# Patient Record
Sex: FEMALE | Race: WHITE | NOT HISPANIC OR LATINO | URBAN - METROPOLITAN AREA
[De-identification: names, ages, dates, MRNs, and addresses within clinical notes are randomized per-mention and may not be internally consistent; named-entity substitution may affect disease eponyms.]

---

## 2017-06-15 ENCOUNTER — OFFICE (AMBULATORY)
Dept: URBAN - METROPOLITAN AREA CLINIC 75 | Facility: CLINIC | Age: 67
End: 2017-06-15
Payer: COMMERCIAL

## 2017-06-15 VITALS
HEART RATE: 66 BPM | SYSTOLIC BLOOD PRESSURE: 116 MMHG | HEIGHT: 68 IN | WEIGHT: 146 LBS | DIASTOLIC BLOOD PRESSURE: 72 MMHG

## 2017-06-15 DIAGNOSIS — K21.9 GASTRO-ESOPHAGEAL REFLUX DISEASE WITHOUT ESOPHAGITIS: ICD-10-CM

## 2017-06-15 DIAGNOSIS — F45.8 OTHER SOMATOFORM DISORDERS: ICD-10-CM

## 2017-06-15 DIAGNOSIS — R11.0 NAUSEA: ICD-10-CM

## 2017-06-15 DIAGNOSIS — K63.89 OTHER SPECIFIED DISEASES OF INTESTINE: ICD-10-CM

## 2017-06-15 DIAGNOSIS — K64.4 RESIDUAL HEMORRHOIDAL SKIN TAGS: ICD-10-CM

## 2017-06-15 DIAGNOSIS — K29.50 UNSPECIFIED CHRONIC GASTRITIS WITHOUT BLEEDING: ICD-10-CM

## 2017-06-15 DIAGNOSIS — Z86.010 PERSONAL HISTORY OF COLONIC POLYPS: ICD-10-CM

## 2017-06-15 DIAGNOSIS — K58.9 IRRITABLE BOWEL SYNDROME WITHOUT DIARRHEA: ICD-10-CM

## 2017-06-15 DIAGNOSIS — R10.13 EPIGASTRIC PAIN: ICD-10-CM

## 2017-06-15 DIAGNOSIS — R10.10 UPPER ABDOMINAL PAIN, UNSPECIFIED: ICD-10-CM

## 2017-06-15 PROCEDURE — 99213 OFFICE O/P EST LOW 20 MIN: CPT | Performed by: INTERNAL MEDICINE

## 2017-06-15 RX ORDER — LACTULOSE 10 G/15ML
SOLUTION ORAL
Qty: 2000 | Refills: 4 | Status: ACTIVE

## 2018-07-26 ENCOUNTER — TRANSCRIBE ORDERS (OUTPATIENT)
Dept: ADMINISTRATIVE | Facility: HOSPITAL | Age: 68
End: 2018-07-26

## 2018-07-26 DIAGNOSIS — M81.0 OSTEOPOROSIS, UNSPECIFIED OSTEOPOROSIS TYPE, UNSPECIFIED PATHOLOGICAL FRACTURE PRESENCE: Primary | ICD-10-CM

## 2018-10-22 ENCOUNTER — HOSPITAL ENCOUNTER (OUTPATIENT)
Dept: BONE DENSITY | Facility: HOSPITAL | Age: 68
Discharge: HOME | End: 2018-10-22
Admitting: FAMILY MEDICINE

## 2018-10-22 DIAGNOSIS — M81.0 OSTEOPOROSIS, UNSPECIFIED OSTEOPOROSIS TYPE, UNSPECIFIED PATHOLOGICAL FRACTURE PRESENCE: ICD-10-CM

## 2018-10-22 PROCEDURE — 77080 DXA BONE DENSITY AXIAL: CPT

## 2019-07-10 ENCOUNTER — OFFICE (AMBULATORY)
Dept: URBAN - METROPOLITAN AREA CLINIC 75 | Facility: CLINIC | Age: 69
End: 2019-07-10

## 2019-07-10 VITALS
SYSTOLIC BLOOD PRESSURE: 108 MMHG | WEIGHT: 154 LBS | HEIGHT: 68 IN | HEART RATE: 61 BPM | DIASTOLIC BLOOD PRESSURE: 70 MMHG

## 2019-07-10 DIAGNOSIS — F45.8 OTHER SOMATOFORM DISORDERS: ICD-10-CM

## 2019-07-10 DIAGNOSIS — R10.13 EPIGASTRIC PAIN: ICD-10-CM

## 2019-07-10 DIAGNOSIS — K63.89 OTHER SPECIFIED DISEASES OF INTESTINE: ICD-10-CM

## 2019-07-10 DIAGNOSIS — K62.5 HEMORRHAGE OF ANUS AND RECTUM: ICD-10-CM

## 2019-07-10 DIAGNOSIS — K21.9 GASTRO-ESOPHAGEAL REFLUX DISEASE WITHOUT ESOPHAGITIS: ICD-10-CM

## 2019-07-10 DIAGNOSIS — K58.9 IRRITABLE BOWEL SYNDROME WITHOUT DIARRHEA: ICD-10-CM

## 2019-07-10 DIAGNOSIS — K64.4 RESIDUAL HEMORRHOIDAL SKIN TAGS: ICD-10-CM

## 2019-07-10 DIAGNOSIS — R11.0 NAUSEA: ICD-10-CM

## 2019-07-10 DIAGNOSIS — R10.10 UPPER ABDOMINAL PAIN, UNSPECIFIED: ICD-10-CM

## 2019-07-10 DIAGNOSIS — Z86.010 PERSONAL HISTORY OF COLONIC POLYPS: ICD-10-CM

## 2019-07-10 DIAGNOSIS — K29.50 UNSPECIFIED CHRONIC GASTRITIS WITHOUT BLEEDING: ICD-10-CM

## 2019-07-10 PROCEDURE — 99214 OFFICE O/P EST MOD 30 MIN: CPT | Performed by: NURSE PRACTITIONER

## 2019-07-10 RX ORDER — HYDROCORTISONE 25 MG/G
5 CREAM TOPICAL
Qty: 1 | Refills: 1 | Status: ACTIVE
Start: 2019-07-10

## 2019-07-22 VITALS
SYSTOLIC BLOOD PRESSURE: 104 MMHG | DIASTOLIC BLOOD PRESSURE: 71 MMHG | OXYGEN SATURATION: 100 % | RESPIRATION RATE: 23 BRPM | HEART RATE: 59 BPM | DIASTOLIC BLOOD PRESSURE: 52 MMHG | HEIGHT: 68 IN | SYSTOLIC BLOOD PRESSURE: 97 MMHG | HEART RATE: 56 BPM | DIASTOLIC BLOOD PRESSURE: 74 MMHG | SYSTOLIC BLOOD PRESSURE: 113 MMHG | SYSTOLIC BLOOD PRESSURE: 90 MMHG | SYSTOLIC BLOOD PRESSURE: 91 MMHG | RESPIRATION RATE: 16 BRPM | RESPIRATION RATE: 21 BRPM | WEIGHT: 150 LBS | RESPIRATION RATE: 10 BRPM | SYSTOLIC BLOOD PRESSURE: 133 MMHG | SYSTOLIC BLOOD PRESSURE: 153 MMHG | HEART RATE: 57 BPM | OXYGEN SATURATION: 99 % | SYSTOLIC BLOOD PRESSURE: 128 MMHG | HEART RATE: 51 BPM | DIASTOLIC BLOOD PRESSURE: 47 MMHG | HEART RATE: 52 BPM | SYSTOLIC BLOOD PRESSURE: 130 MMHG | DIASTOLIC BLOOD PRESSURE: 89 MMHG | TEMPERATURE: 96.8 F | HEART RATE: 49 BPM | RESPIRATION RATE: 20 BRPM | OXYGEN SATURATION: 96 % | HEART RATE: 60 BPM | DIASTOLIC BLOOD PRESSURE: 56 MMHG | TEMPERATURE: 96.9 F | RESPIRATION RATE: 18 BRPM | RESPIRATION RATE: 17 BRPM | DIASTOLIC BLOOD PRESSURE: 54 MMHG | DIASTOLIC BLOOD PRESSURE: 73 MMHG | HEART RATE: 58 BPM | SYSTOLIC BLOOD PRESSURE: 103 MMHG | SYSTOLIC BLOOD PRESSURE: 124 MMHG | HEART RATE: 50 BPM | SYSTOLIC BLOOD PRESSURE: 99 MMHG | DIASTOLIC BLOOD PRESSURE: 68 MMHG | RESPIRATION RATE: 12 BRPM | SYSTOLIC BLOOD PRESSURE: 126 MMHG | RESPIRATION RATE: 13 BRPM | HEART RATE: 62 BPM | RESPIRATION RATE: 15 BRPM | DIASTOLIC BLOOD PRESSURE: 65 MMHG | HEART RATE: 54 BPM

## 2019-07-23 ENCOUNTER — AMBULATORY SURGICAL CENTER (AMBULATORY)
Dept: URBAN - METROPOLITAN AREA SURGERY 17 | Facility: SURGERY | Age: 69
End: 2019-07-23
Payer: COMMERCIAL

## 2019-07-23 ENCOUNTER — OFFICE (AMBULATORY)
Dept: URBAN - METROPOLITAN AREA PATHOLOGY 4 | Facility: PATHOLOGY | Age: 69
End: 2019-07-23

## 2019-07-23 DIAGNOSIS — K62.5 HEMORRHAGE OF ANUS AND RECTUM: ICD-10-CM

## 2019-07-23 DIAGNOSIS — D12.3 BENIGN NEOPLASM OF TRANSVERSE COLON: ICD-10-CM

## 2019-07-23 DIAGNOSIS — K62.1 RECTAL POLYP: ICD-10-CM

## 2019-07-23 DIAGNOSIS — Z86.010 PERSONAL HISTORY OF COLONIC POLYPS: ICD-10-CM

## 2019-07-23 DIAGNOSIS — D12.8 BENIGN NEOPLASM OF RECTUM: ICD-10-CM

## 2019-07-23 DIAGNOSIS — D12.2 BENIGN NEOPLASM OF ASCENDING COLON: ICD-10-CM

## 2019-07-23 DIAGNOSIS — K63.5 POLYP OF COLON: ICD-10-CM

## 2019-07-23 LAB
GI HISTOLOGY: A. UNSPECIFIED: (no result)
GI HISTOLOGY: B. UNSPECIFIED: (no result)
GI HISTOLOGY: C. UNSPECIFIED: (no result)
GI HISTOLOGY: PDF REPORT: (no result)

## 2019-07-23 PROCEDURE — 88305 TISSUE EXAM BY PATHOLOGIST: CPT | Performed by: INTERNAL MEDICINE

## 2019-07-23 PROCEDURE — 45385 COLONOSCOPY W/LESION REMOVAL: CPT | Performed by: INTERNAL MEDICINE

## 2019-07-23 PROCEDURE — 45380 COLONOSCOPY AND BIOPSY: CPT | Mod: 59 | Performed by: INTERNAL MEDICINE

## 2019-07-23 NOTE — SERVICEHPINOTES
I had the pleasure of seeing Ms. Mena in our gastroenterology office for follow-up visit. As you know, she is a very pleasant 68-year-old  female patient of Dr. Thayer with a history of GERD with esophagitis, IBS-C, and colon polyps. She was last seen in our office 6/15/2017 and was doing well from an upper and lower GI standpoint at that time. She presents today for evaluation of rectal bleeding.Patient has chronic constipation and takes lactulose 25 mL's 2 times per day w/ 1-2 formed BMs per day that are easy to pass. She was treated for a sinus infection in 2/2019 and since taking antibiotics she now sees blood in her stool and tooilet paper which she attributes to her hemorrhoids. She will a see BRBPR with every BM. She also reports pain and uses A&ampD ointment but has not tried preparation H or prescription medication for treatment of hemorrhoids. Underwent colonoscopy 3/3/2015 which revealed a 5 mm polyp in the cecum (do not have path available for review), internal/external hemorrhoids. Appetite and weight have been stable. She denies melena, unexplained weight loss, diarrhea, fecal leaking or incontinence.She is having more reflux now than at her last OV. She has not been on medication for several years. Her symptoms are worse in the evening but is sleeping on her left side with some improvement. She denies dysphagia, odynophagia, nausea, vomiting, abdominal pain, bloating or distention.DATA REVIEWED:BREGD 2/13/2013 nl esophagus and duodenum gastritis. Pathology revealed chronic gastritis, negative for H. pyloriBRCN 3/3/2015 with 5 mm polyp in the cecum and external hemorrhoids (no pathology available for review)

## 2020-02-21 ENCOUNTER — OFFICE VISIT (OUTPATIENT)
Dept: ORTHOPEDIC SURGERY | Facility: CLINIC | Age: 70
End: 2020-02-21

## 2020-02-21 VITALS — HEIGHT: 69 IN | TEMPERATURE: 97.5 F | WEIGHT: 151 LBS | BODY MASS INDEX: 22.36 KG/M2

## 2020-02-21 DIAGNOSIS — M25.562 CHRONIC PAIN OF LEFT KNEE: Primary | ICD-10-CM

## 2020-02-21 DIAGNOSIS — M17.12 ARTHRITIS OF LEFT KNEE: ICD-10-CM

## 2020-02-21 DIAGNOSIS — G89.29 CHRONIC PAIN OF LEFT KNEE: Primary | ICD-10-CM

## 2020-02-21 PROCEDURE — 99203 OFFICE O/P NEW LOW 30 MIN: CPT | Performed by: ORTHOPAEDIC SURGERY

## 2020-02-21 PROCEDURE — 20610 DRAIN/INJ JOINT/BURSA W/O US: CPT | Performed by: ORTHOPAEDIC SURGERY

## 2020-02-21 PROCEDURE — 73562 X-RAY EXAM OF KNEE 3: CPT | Performed by: ORTHOPAEDIC SURGERY

## 2020-02-21 RX ORDER — ISOPROPYL ALCOHOL 0.7 ML/ML
SWAB TOPICAL 2 TIMES DAILY
COMMUNITY
Start: 2020-02-05

## 2020-02-21 RX ORDER — ALENDRONATE SODIUM 70 MG/1
TABLET ORAL
COMMUNITY
Start: 2020-02-05

## 2020-02-21 RX ORDER — FLUTICASONE PROPIONATE 50 MCG
SPRAY, SUSPENSION (ML) NASAL
COMMUNITY
Start: 2020-01-08

## 2020-02-21 RX ORDER — GUAIFENESIN 200 MG/1
400 TABLET ORAL
COMMUNITY

## 2020-02-21 RX ORDER — PRAVASTATIN SODIUM 10 MG
TABLET ORAL
COMMUNITY
Start: 2020-02-11

## 2020-02-21 RX ORDER — METHYLPREDNISOLONE ACETATE 80 MG/ML
80 INJECTION, SUSPENSION INTRA-ARTICULAR; INTRALESIONAL; INTRAMUSCULAR; SOFT TISSUE
Status: COMPLETED | OUTPATIENT
Start: 2020-02-21 | End: 2020-02-21

## 2020-02-21 RX ORDER — CITALOPRAM 10 MG/1
TABLET ORAL
COMMUNITY
Start: 2020-02-10

## 2020-02-21 RX ORDER — TIZANIDINE 4 MG/1
TABLET ORAL
COMMUNITY
Start: 2019-12-26

## 2020-02-21 RX ORDER — KETOTIFEN FUMARATE 0.35 MG/ML
SOLUTION/ DROPS OPHTHALMIC
COMMUNITY

## 2020-02-21 RX ORDER — LINAGLIPTIN 5 MG/1
5 TABLET, FILM COATED ORAL DAILY
COMMUNITY
Start: 2020-01-27

## 2020-02-21 RX ORDER — LACTULOSE 10 G/15ML
SOLUTION ORAL; RECTAL
COMMUNITY
Start: 2020-02-19

## 2020-02-21 RX ORDER — NEOMYCIN/POLYMYXIN B/PRAMOXINE 3.5-10K-1
CREAM (GRAM) TOPICAL
COMMUNITY

## 2020-02-21 RX ORDER — FEXOFENADINE HCL 180 MG/1
TABLET ORAL
COMMUNITY

## 2020-02-21 RX ORDER — LACTULOSE 10 G/15ML
SOLUTION ORAL
COMMUNITY

## 2020-02-21 RX ORDER — LANCING DEVICE
EACH MISCELLANEOUS 2 TIMES DAILY
COMMUNITY
Start: 2020-02-05

## 2020-02-21 RX ORDER — MAGNESIUM CHLORIDE 64 MG
TABLET, DELAYED RELEASE (ENTERIC COATED) ORAL
COMMUNITY

## 2020-02-21 RX ORDER — OLOPATADINE HYDROCHLORIDE 2 MG/ML
1 SOLUTION/ DROPS OPHTHALMIC
COMMUNITY

## 2020-02-21 RX ORDER — OMEPRAZOLE 20 MG/1
CAPSULE, DELAYED RELEASE ORAL
COMMUNITY
Start: 2020-01-27

## 2020-02-21 RX ORDER — LEVOTHYROXINE SODIUM 88 UG/1
TABLET ORAL
COMMUNITY
Start: 2020-02-19

## 2020-02-21 RX ORDER — BLOOD-GLUCOSE METER
KIT MISCELLANEOUS 2 TIMES DAILY
COMMUNITY
Start: 2020-02-05

## 2020-02-21 RX ORDER — CALCIUM CARBONATE 500(1250)
600 TABLET ORAL 4 TIMES DAILY
COMMUNITY

## 2020-02-21 RX ORDER — ASPIRIN 325 MG
325 TABLET ORAL DAILY
COMMUNITY

## 2020-02-21 RX ADMIN — METHYLPREDNISOLONE ACETATE 80 MG: 80 INJECTION, SUSPENSION INTRA-ARTICULAR; INTRALESIONAL; INTRAMUSCULAR; SOFT TISSUE at 08:30

## 2020-03-02 ENCOUNTER — OFFICE (AMBULATORY)
Dept: URBAN - METROPOLITAN AREA CLINIC 75 | Facility: CLINIC | Age: 70
End: 2020-03-02
Payer: COMMERCIAL

## 2020-03-02 VITALS
WEIGHT: 152 LBS | SYSTOLIC BLOOD PRESSURE: 118 MMHG | DIASTOLIC BLOOD PRESSURE: 70 MMHG | HEART RATE: 62 BPM | HEIGHT: 68 IN

## 2020-03-02 DIAGNOSIS — K64.8 OTHER HEMORRHOIDS: ICD-10-CM

## 2020-03-02 DIAGNOSIS — K58.9 IRRITABLE BOWEL SYNDROME WITHOUT DIARRHEA: ICD-10-CM

## 2020-03-02 DIAGNOSIS — K29.50 UNSPECIFIED CHRONIC GASTRITIS WITHOUT BLEEDING: ICD-10-CM

## 2020-03-02 DIAGNOSIS — K62.5 HEMORRHAGE OF ANUS AND RECTUM: ICD-10-CM

## 2020-03-02 DIAGNOSIS — K64.4 RESIDUAL HEMORRHOIDAL SKIN TAGS: ICD-10-CM

## 2020-03-02 DIAGNOSIS — K21.9 GASTRO-ESOPHAGEAL REFLUX DISEASE WITHOUT ESOPHAGITIS: ICD-10-CM

## 2020-03-02 PROCEDURE — 99213 OFFICE O/P EST LOW 20 MIN: CPT | Performed by: NURSE PRACTITIONER

## 2020-03-02 RX ORDER — FAMOTIDINE 20 MG/1
40 TABLET, FILM COATED ORAL
Qty: 180 | Refills: 3 | Status: COMPLETED
Start: 2020-03-02 | End: 2020-10-15

## 2020-07-24 ENCOUNTER — OFFICE VISIT (OUTPATIENT)
Dept: ORTHOPEDIC SURGERY | Facility: CLINIC | Age: 70
End: 2020-07-24

## 2020-07-24 VITALS — HEIGHT: 68 IN | WEIGHT: 150 LBS | TEMPERATURE: 98 F | BODY MASS INDEX: 22.73 KG/M2

## 2020-07-24 DIAGNOSIS — M17.12 ARTHRITIS OF LEFT KNEE: Primary | ICD-10-CM

## 2020-07-24 PROCEDURE — 20610 DRAIN/INJ JOINT/BURSA W/O US: CPT | Performed by: ORTHOPAEDIC SURGERY

## 2020-07-24 PROCEDURE — 99213 OFFICE O/P EST LOW 20 MIN: CPT | Performed by: ORTHOPAEDIC SURGERY

## 2020-07-24 RX ORDER — METHYLPREDNISOLONE ACETATE 80 MG/ML
80 INJECTION, SUSPENSION INTRA-ARTICULAR; INTRALESIONAL; INTRAMUSCULAR; SOFT TISSUE
Status: COMPLETED | OUTPATIENT
Start: 2020-07-24 | End: 2020-07-24

## 2020-07-24 RX ADMIN — METHYLPREDNISOLONE ACETATE 80 MG: 80 INJECTION, SUSPENSION INTRA-ARTICULAR; INTRALESIONAL; INTRAMUSCULAR; SOFT TISSUE at 09:46

## 2020-07-24 NOTE — PROGRESS NOTES
Patient Name: Brenda Brown   YOB: 1950  Referring Primary Care Physician: Lisa Camargo MD  BMI: Body mass index is 22.81 kg/m².    Chief Complaint:    Chief Complaint   Patient presents with   • Left Knee - Follow-up   • Right Knee - Follow-up        HPI:     Brenda Brown is a 69 y.o. female who presents today for evaluation of   Chief Complaint   Patient presents with   • Left Knee - Follow-up   • Right Knee - Follow-up   .  Right knee was doing better but the left knee is bothering her at this time she follows up for it.  Discussed treatment options with her she thinks injections helped as much is anything in the past    This problem is not new to this examiner.     Subjective   Medications:   Home Medications:  Current Outpatient Medications on File Prior to Visit   Medication Sig   • Alcohol Swabs (PHARMACIST CHOICE ALCOHOL) pads 2 (Two) Times a Day. as directed   • alendronate (FOSAMAX) 70 MG tablet TAKE 1 (ONE) TABLET WEEKLY BEFORE BREAKFAST   • aspirin 325 MG tablet Take 325 mg by mouth Daily.   • BIOTIN PO Take  by mouth Daily.   • Calcium 500 MG tablet Take 600 mg by mouth 4 (Four) Times a Day.   • Cholecalciferol (VITAMIN D-1000 MAX ST) 25 MCG (1000 UT) tablet Take 1,000 Units by mouth Daily.   • citalopram (CeleXA) 10 MG tablet TAKE 3 TABLETS (30 MG) BY MOUTH ONCE DAILY DOSE INCREASE FROM 20 MG   • cyanocobalamin ([employer] VITAMIN B-12) 1000 MCG tablet Take  by mouth.   • ENULOSE 10 GM/15ML solution solution (encephalopathy) TAKE 1-2 TABLESPOONS (15-30 ML'S) 2-3 TIMES DAILY AS NEEDED   • fexofenadine (ALLEGRA) 180 MG tablet Take  by mouth.   • fluticasone (FLONASE) 50 MCG/ACT nasal spray INHALE 2 (TWO) SPRAYS IN EACH NOSTRIL ONCE DAILY   • FREESTYLE LITE test strip 2 (Two) Times a Day. use for testing   • glucose blood (ONE TOUCH ULTRA TEST) test strip TEST TWICE DAILY AS DIRECTED   • glucose blood test strip TEST TWICE DAILY AS DIRECTED   • guaiFENesin 200 MG tablet Take 400 mg  by mouth.   • hydrocortisone 2.5 % cream APPLY A SMALL AMOUNT TO RECTUM TWICE DAILY AS NEEDED FOR 10 DAYS   • ketotifen (ZADITOR) 0.025 % ophthalmic solution Apply  to eye(s) as directed by provider.   • lactulose (CHRONULAC) 10 GM/15ML solution    • Lancet Devices (SIMPLE DIAGNOSTICS LANCING DEV) misc 2 (Two) Times a Day. as directed   • levothyroxine (SYNTHROID, LEVOTHROID) 88 MCG tablet TAKE 1 (ONE) TABLET BY MOUTH ONCE DAILY ON EMPTY STOMACH   • magnesium chloride ER 64 MG DR tablet Take  by mouth.   • olopatadine (PATADAY) 0.2 % solution ophthalmic solution Apply 1 drop to eye(s) as directed by provider.   • Omega-3 Fatty Acids (FISH OIL PEARLS) 180 MG capsule Take  by mouth.   • omeprazole (priLOSEC) 20 MG capsule TAKE 1 (ONE) CAPSULE BY MOUTH ONCE DAILY   • PHARMACIST CHOICE LANCETS misc 2 (Two) Times a Day.   • pravastatin (PRAVACHOL) 10 MG tablet TAKE 1 (ONE) TABLET BY MOUTH EACH NIGHT AT BEDTIME   • PROBIOTIC PRODUCT PO Take 1 tablet by mouth Daily.   • tiZANidine (ZANAFLEX) 4 MG tablet TAKE 1/2 TABLET (2 MG) BY MOUTH AS NEEDED   • TRADJENTA 5 MG tablet tablet Take 5 mg by mouth Daily.   • Unable to find 400 mcg.     No current facility-administered medications on file prior to visit.      Current Medications:  Scheduled Meds:  Continuous Infusions:  No current facility-administered medications for this visit.   PRN Meds:.    I have reviewed the patient's medical history in detail and updated the computerized patient record.  Review and summarization of old records includes:    Past Medical History:   Diagnosis Date   • Anxiety    • Cardiac disease    • Depression    • Diabetes (CMS/HCC)    • Osteoporosis    • Stroke (CMS/HCC)    • Thyroid activity decreased f        Past Surgical History:   Procedure Laterality Date   • DEEP BRAIN STIMULATOR PLACEMENT     • GALLBLADDER SURGERY     • TONSILLECTOMY          Social History     Occupational History   • Not on file   Tobacco Use   • Smoking status: Never  "Smoker   • Smokeless tobacco: Never Used   Substance and Sexual Activity   • Alcohol use: Not on file   • Drug use: Not on file   • Sexual activity: Not on file      Social History     Social History Narrative   • Not on file        Family History   Problem Relation Age of Onset   • Heart disease Mother    • Hypertension Mother    • Heart disease Father    • Hypertension Father        ROS: 14 point review of systems was performed and all other systems were reviewed and are negative except for documented findings in HPI and today's encounter.     Allergies:   Allergies   Allergen Reactions   • Codeine Nausea And Vomiting   • Cephalexin Rash     Constitutional:  Denies fever, shaking or chills   Eyes:  Denies change in visual acuity   HENT:  Denies nasal congestion or sore throat   Respiratory:  Denies cough or shortness of breath   Cardiovascular:  Denies chest pain or severe LE edema   GI:  Denies abdominal pain, nausea, vomiting, bloody stools or diarrhea   Musculoskeletal:  Numbness, tingling, pain, or loss of motor function only as noted above in history of present illness.  : Denies painful urination or hematuria  Integument:  Denies rash, lesion or ulceration   Neurologic:  Denies headache or focal weakness  Endocrine:  Denies lymphadenopathy  Psych:  Denies confusion or change in mental status   Hem:  Denies active bleeding    OBJECTIVE:  Physical Exam: 69 y.o. female  Wt Readings from Last 3 Encounters:   07/24/20 68 kg (150 lb)   02/21/20 68.5 kg (151 lb)     Ht Readings from Last 1 Encounters:   07/24/20 172.7 cm (68\")     Body mass index is 22.81 kg/m².  Vitals:    07/24/20 0943   Temp: 98 °F (36.7 °C)     Vital signs reviewed.     General Appearance:    Alert, cooperative, in no acute distress                  Eyes: conjunctiva clear  ENT: external ears and nose atraumatic  CV: no peripheral edema  Resp: normal respiratory effort  Skin: no rashes or wounds; normal turgor  Psych: mood and affect " appropriate  Lymph: no nodes appreciated  Neuro: gross sensation intact  Vascular:  Palpable peripheral pulse in noted extremity  Musculoskeletal Extremities: Crepitation synovitis swelling medial joint line tenderness little bit of stiffness.    Radiology:   AP lateral 40 agree PA x-rays from previously show arthritis    Assessment:     ICD-10-CM ICD-9-CM   1. Arthritis of left knee M17.12 716.96        Large Joint Arthrocentesis: L knee  Date/Time: 7/24/2020 9:46 AM  Consent given by: patient  Site marked: site marked  Timeout: Immediately prior to procedure a time out was called to verify the correct patient, procedure, equipment, support staff and site/side marked as required   Supporting Documentation  Indications: pain   Procedure Details  Location: knee - L knee  Preparation: Patient was prepped and draped in the usual sterile fashion  Needle gauge: 21.  Approach: anterolateral  Medications administered: 4 mL lidocaine (cardiac); 80 mg methylPREDNISolone acetate 80 MG/ML  Patient tolerance: patient tolerated the procedure well with no immediate complications             Plan: Biomechanics of pertinent body area discussed.  Risks, benefits, alternatives, comparisons, and complications of accepted medicines, injections, recommendations, surgical procedures, and therapies explained and education provided in laymen's terms. Natural history and expected course of this patient's diagnosis discussed along with evaluation of therapies. Questions answered. When appropriate I also discussed proper use of cane, walker, trekking poles.   RICE: Rest, ice, compression, and elevation therapy, Cryotherapy/brachy therapy, and or OTC linaments as indicated with instructions.   Cortisone Injection. See procedure note.      7/24/2020    Much of this encounter note is an electronic transcription/translation of spoken language to printed text. The electronic translation of spoken language may permit erroneous, or at times,  nonsensical words or phrases to be inadvertently transcribed; Although I have reviewed the note for such errors, some may still exist

## 2020-10-15 ENCOUNTER — OFFICE (AMBULATORY)
Dept: URBAN - METROPOLITAN AREA CLINIC 75 | Facility: CLINIC | Age: 70
End: 2020-10-15
Payer: COMMERCIAL

## 2020-10-15 VITALS — HEIGHT: 68 IN | WEIGHT: 153 LBS

## 2020-10-15 DIAGNOSIS — K62.5 HEMORRHAGE OF ANUS AND RECTUM: ICD-10-CM

## 2020-10-15 DIAGNOSIS — R10.10 UPPER ABDOMINAL PAIN, UNSPECIFIED: ICD-10-CM

## 2020-10-15 DIAGNOSIS — K64.4 RESIDUAL HEMORRHOIDAL SKIN TAGS: ICD-10-CM

## 2020-10-15 DIAGNOSIS — K21.9 GASTRO-ESOPHAGEAL REFLUX DISEASE WITHOUT ESOPHAGITIS: ICD-10-CM

## 2020-10-15 DIAGNOSIS — D12.2 BENIGN NEOPLASM OF ASCENDING COLON: ICD-10-CM

## 2020-10-15 DIAGNOSIS — K29.50 UNSPECIFIED CHRONIC GASTRITIS WITHOUT BLEEDING: ICD-10-CM

## 2020-10-15 DIAGNOSIS — F45.8 OTHER SOMATOFORM DISORDERS: ICD-10-CM

## 2020-10-15 DIAGNOSIS — K58.9 IRRITABLE BOWEL SYNDROME WITHOUT DIARRHEA: ICD-10-CM

## 2020-10-15 DIAGNOSIS — R10.13 EPIGASTRIC PAIN: ICD-10-CM

## 2020-10-15 DIAGNOSIS — D12.3 BENIGN NEOPLASM OF TRANSVERSE COLON: ICD-10-CM

## 2020-10-15 DIAGNOSIS — R10.11 RIGHT UPPER QUADRANT PAIN: ICD-10-CM

## 2020-10-15 DIAGNOSIS — K63.89 OTHER SPECIFIED DISEASES OF INTESTINE: ICD-10-CM

## 2020-10-15 PROCEDURE — 99213 OFFICE O/P EST LOW 20 MIN: CPT | Performed by: NURSE PRACTITIONER

## 2020-11-04 ENCOUNTER — CLINICAL SUPPORT (OUTPATIENT)
Dept: ORTHOPEDIC SURGERY | Facility: CLINIC | Age: 70
End: 2020-11-04

## 2020-11-04 VITALS — HEIGHT: 68 IN | TEMPERATURE: 95.5 F | BODY MASS INDEX: 23.04 KG/M2 | WEIGHT: 152 LBS

## 2020-11-04 DIAGNOSIS — M17.0 BILATERAL PRIMARY OSTEOARTHRITIS OF KNEE: Primary | ICD-10-CM

## 2020-11-04 PROCEDURE — 20610 DRAIN/INJ JOINT/BURSA W/O US: CPT | Performed by: ORTHOPAEDIC SURGERY

## 2020-11-04 PROCEDURE — 99213 OFFICE O/P EST LOW 20 MIN: CPT | Performed by: ORTHOPAEDIC SURGERY

## 2020-11-04 RX ORDER — METHYLPREDNISOLONE ACETATE 80 MG/ML
80 INJECTION, SUSPENSION INTRA-ARTICULAR; INTRALESIONAL; INTRAMUSCULAR; SOFT TISSUE
Status: COMPLETED | OUTPATIENT
Start: 2020-11-04 | End: 2020-11-04

## 2020-11-04 RX ADMIN — METHYLPREDNISOLONE ACETATE 80 MG: 80 INJECTION, SUSPENSION INTRA-ARTICULAR; INTRALESIONAL; INTRAMUSCULAR; SOFT TISSUE at 07:53

## 2020-11-04 RX ADMIN — METHYLPREDNISOLONE ACETATE 80 MG: 80 INJECTION, SUSPENSION INTRA-ARTICULAR; INTRALESIONAL; INTRAMUSCULAR; SOFT TISSUE at 07:52

## 2020-11-04 NOTE — PROGRESS NOTES
Patient Name: Brenda Brown   YOB: 1950  Referring Primary Care Physician: Lisa Camargo MD  BMI: Body mass index is 23.11 kg/m².    Chief Complaint:    Chief Complaint   Patient presents with   • Left Knee - Follow-up, Pain   • Right Knee - Follow-up, Pain        HPI:     Brenda Brown is a 70 y.o. female who presents today for evaluation of   Chief Complaint   Patient presents with   • Left Knee - Follow-up, Pain   • Right Knee - Follow-up, Pain   .  She follows up today on her knees.  She is been doing her physical therapy and says it helps.  She is also doing an exercise class on zoom several days a week.  She has about options including continued injections and surgery and went over the details of both.      Subjective   Medications:   Home Medications:  Current Outpatient Medications on File Prior to Visit   Medication Sig   • Alcohol Swabs (PHARMACIST CHOICE ALCOHOL) pads 2 (Two) Times a Day. as directed   • alendronate (FOSAMAX) 70 MG tablet TAKE 1 (ONE) TABLET WEEKLY BEFORE BREAKFAST   • aspirin 325 MG tablet Take 325 mg by mouth Daily.   • BIOTIN PO Take  by mouth Daily.   • Calcium 500 MG tablet Take 600 mg by mouth 4 (Four) Times a Day.   • Cholecalciferol (VITAMIN D-1000 MAX ST) 25 MCG (1000 UT) tablet Take 1,000 Units by mouth Daily.   • citalopram (CeleXA) 10 MG tablet TAKE 3 TABLETS (30 MG) BY MOUTH ONCE DAILY DOSE INCREASE FROM 20 MG   • cyanocobalamin ([employer] VITAMIN B-12) 1000 MCG tablet Take  by mouth.   • ENULOSE 10 GM/15ML solution solution (encephalopathy) TAKE 1-2 TABLESPOONS (15-30 ML'S) 2-3 TIMES DAILY AS NEEDED   • fexofenadine (ALLEGRA) 180 MG tablet Take  by mouth.   • fluticasone (FLONASE) 50 MCG/ACT nasal spray INHALE 2 (TWO) SPRAYS IN EACH NOSTRIL ONCE DAILY   • FREESTYLE LITE test strip 2 (Two) Times a Day. use for testing   • glucose blood (ONE TOUCH ULTRA TEST) test strip TEST TWICE DAILY AS DIRECTED   • glucose blood test strip TEST TWICE DAILY AS DIRECTED    • guaiFENesin 200 MG tablet Take 400 mg by mouth.   • hydrocortisone 2.5 % cream APPLY A SMALL AMOUNT TO RECTUM TWICE DAILY AS NEEDED FOR 10 DAYS   • ketotifen (ZADITOR) 0.025 % ophthalmic solution Apply  to eye(s) as directed by provider.   • lactulose (CHRONULAC) 10 GM/15ML solution    • Lancet Devices (SIMPLE DIAGNOSTICS LANCING DEV) misc 2 (Two) Times a Day. as directed   • levothyroxine (SYNTHROID, LEVOTHROID) 88 MCG tablet TAKE 1 (ONE) TABLET BY MOUTH ONCE DAILY ON EMPTY STOMACH   • magnesium chloride ER 64 MG DR tablet Take  by mouth.   • olopatadine (PATADAY) 0.2 % solution ophthalmic solution Apply 1 drop to eye(s) as directed by provider.   • Omega-3 Fatty Acids (FISH OIL PEARLS) 180 MG capsule Take  by mouth.   • omeprazole (priLOSEC) 20 MG capsule TAKE 1 (ONE) CAPSULE BY MOUTH ONCE DAILY   • PHARMACIST CHOICE LANCETS misc 2 (Two) Times a Day.   • pravastatin (PRAVACHOL) 10 MG tablet TAKE 1 (ONE) TABLET BY MOUTH EACH NIGHT AT BEDTIME   • PROBIOTIC PRODUCT PO Take 1 tablet by mouth Daily.   • tiZANidine (ZANAFLEX) 4 MG tablet TAKE 1/2 TABLET (2 MG) BY MOUTH AS NEEDED   • TRADJENTA 5 MG tablet tablet Take 5 mg by mouth Daily.   • Unable to find 400 mcg.     No current facility-administered medications on file prior to visit.      Current Medications:  Scheduled Meds:  Continuous Infusions:No current facility-administered medications for this visit.     PRN Meds:.    I have reviewed the patient's medical history in detail and updated the computerized patient record.  Review and summarization of old records includes:    Past Medical History:   Diagnosis Date   • Anxiety    • Cardiac disease    • Depression    • Diabetes (CMS/HCC)    • Osteoporosis    • Stroke (CMS/HCC)    • Thyroid activity decreased f        Past Surgical History:   Procedure Laterality Date   • DEEP BRAIN STIMULATOR PLACEMENT     • GALLBLADDER SURGERY     • TONSILLECTOMY          Social History     Occupational History   • Not on file  "  Tobacco Use   • Smoking status: Never Smoker   • Smokeless tobacco: Never Used   Substance and Sexual Activity   • Alcohol use: Not on file   • Drug use: Not on file   • Sexual activity: Not on file      Social History     Social History Narrative   • Not on file        Family History   Problem Relation Age of Onset   • Heart disease Mother    • Hypertension Mother    • Heart disease Father    • Hypertension Father        ROS: 14 point review of systems was performed and all other systems were reviewed and are negative except for documented findings in HPI and today's encounter.     Allergies:   Allergies   Allergen Reactions   • Codeine Nausea And Vomiting   • Cephalexin Rash     Constitutional:  Denies fever, shaking or chills   Eyes:  Denies change in visual acuity   HENT:  Denies nasal congestion or sore throat   Respiratory:  Denies cough or shortness of breath   Cardiovascular:  Denies chest pain or severe LE edema   GI:  Denies abdominal pain, nausea, vomiting, bloody stools or diarrhea   Musculoskeletal:  Numbness, tingling, pain, or loss of motor function only as noted above in history of present illness.  : Denies painful urination or hematuria  Integument:  Denies rash, lesion or ulceration   Neurologic:  Denies headache or focal weakness  Endocrine:  Denies lymphadenopathy  Psych:  Denies confusion or change in mental status   Hem:  Denies active bleeding    OBJECTIVE:  Physical Exam: 70 y.o. female  Wt Readings from Last 3 Encounters:   11/04/20 68.9 kg (152 lb)   07/24/20 68 kg (150 lb)   02/21/20 68.5 kg (151 lb)     Ht Readings from Last 1 Encounters:   11/04/20 172.7 cm (68\")     Body mass index is 23.11 kg/m².  Vitals:    11/04/20 1519   Temp: 95.5 °F (35.3 °C)     Vital signs reviewed.     General Appearance:    Alert, cooperative, in no acute distress                  Eyes: conjunctiva clear  ENT: external ears and nose atraumatic  CV: no peripheral edema  Resp: normal respiratory " effort  Skin: no rashes or wounds; normal turgor  Psych: mood and affect appropriate  Lymph: no nodes appreciated  Neuro: gross sensation intact  Vascular:  Palpable peripheral pulse in noted extremity  Musculoskeletal Extremities: She has crepitation synovitis swelling and joint line tenderness in both knees with some stiffness    Radiology:   AP lateral 40 degree PA x-rays taken in the past viewed at this time without comparison view available shows arthritis    Assessment:     ICD-10-CM ICD-9-CM   1. Bilateral primary osteoarthritis of knee  M17.0 715.16        Large Joint Arthrocentesis: R knee  Date/Time: 11/4/2020 7:52 AM  Consent given by: patient  Site marked: site marked  Timeout: Immediately prior to procedure a time out was called to verify the correct patient, procedure, equipment, support staff and site/side marked as required   Supporting Documentation  Indications: pain   Procedure Details  Location: knee - R knee  Preparation: Patient was prepped and draped in the usual sterile fashion  Needle gauge: 21.  Approach: anterolateral  Medications administered: 4 mL lidocaine (cardiac); 80 mg methylPREDNISolone acetate 80 MG/ML  Patient tolerance: patient tolerated the procedure well with no immediate complications    Large Joint Arthrocentesis: L knee  Date/Time: 11/4/2020 7:53 AM  Consent given by: patient  Site marked: site marked  Timeout: Immediately prior to procedure a time out was called to verify the correct patient, procedure, equipment, support staff and site/side marked as required   Supporting Documentation  Indications: pain   Procedure Details  Location: knee - L knee  Preparation: Patient was prepped and draped in the usual sterile fashion  Needle gauge: 21.  Approach: anterolateral  Medications administered: 4 mL lidocaine (cardiac); 80 mg methylPREDNISolone acetate 80 MG/ML  Patient tolerance: patient tolerated the procedure well with no immediate complications             Plan: The  diagnosis(es), natural history, pathophysiology and treatment for diagnosis(es) were discussed. Opportunity given and questions answered.  Biomechanics of pertinent body areas discussed.  When appropriate, the use of ambulatory aids discussed.  EXERCISES:  Advice on benefits of, and types of regular/moderate exercise pertaining to orthopedic diagnosis(es).  DIABETES:  Diabetic counseling and how it affects the diagnosis and treatment  Inflammation/pain control; with cold, heat, elevation and/or liniments discussed as appropriate  Cortisone Injection. See procedure note.  HOME EXERCISE/PT program encouraged      11/4/2020    Much of this encounter note is an electronic transcription/translation of spoken language to printed text. The electronic translation of spoken language may permit erroneous, or at times, nonsensical words or phrases to be inadvertently transcribed; Although I have reviewed the note for such errors, some may still exist

## 2021-02-19 ENCOUNTER — OFFICE VISIT (OUTPATIENT)
Dept: ORTHOPEDIC SURGERY | Facility: CLINIC | Age: 71
End: 2021-02-19

## 2021-02-19 VITALS — TEMPERATURE: 97.6 F | WEIGHT: 153 LBS | BODY MASS INDEX: 22.66 KG/M2 | HEIGHT: 69 IN

## 2021-02-19 DIAGNOSIS — R52 PAIN: ICD-10-CM

## 2021-02-19 DIAGNOSIS — M17.0 BILATERAL PRIMARY OSTEOARTHRITIS OF KNEE: Primary | ICD-10-CM

## 2021-02-19 PROCEDURE — 99212 OFFICE O/P EST SF 10 MIN: CPT | Performed by: ORTHOPAEDIC SURGERY

## 2021-02-19 PROCEDURE — 73562 X-RAY EXAM OF KNEE 3: CPT | Performed by: ORTHOPAEDIC SURGERY

## 2021-02-19 PROCEDURE — 20610 DRAIN/INJ JOINT/BURSA W/O US: CPT | Performed by: ORTHOPAEDIC SURGERY

## 2021-02-19 RX ORDER — METHYLPREDNISOLONE ACETATE 80 MG/ML
80 INJECTION, SUSPENSION INTRA-ARTICULAR; INTRALESIONAL; INTRAMUSCULAR; SOFT TISSUE
Status: COMPLETED | OUTPATIENT
Start: 2021-02-19 | End: 2021-02-19

## 2021-02-19 RX ADMIN — METHYLPREDNISOLONE ACETATE 80 MG: 80 INJECTION, SUSPENSION INTRA-ARTICULAR; INTRALESIONAL; INTRAMUSCULAR; SOFT TISSUE at 07:44

## 2021-02-19 RX ADMIN — METHYLPREDNISOLONE ACETATE 80 MG: 80 INJECTION, SUSPENSION INTRA-ARTICULAR; INTRALESIONAL; INTRAMUSCULAR; SOFT TISSUE at 07:43

## 2021-02-19 NOTE — PROGRESS NOTES
Patient Name: Brenda Brown   YOB: 1950  Referring Primary Care Physician: Lisa Camargo MD  BMI: Body mass index is 22.93 kg/m².    Chief Complaint:    Chief Complaint   Patient presents with   • Left Knee - Pain   • Right Knee - Pain        HPI:     Brenda Brown is a 70 y.o. female who presents today for evaluation of   Chief Complaint   Patient presents with   • Left Knee - Pain   • Right Knee - Pain     The patient reports bilateral knee pain, with left knee pain more bothersome than the right knee.  She denies any recent trauma.  It is intensifying and bothering her more.  She still does some regular exercise and this was reinforced and talked to her about treatments including medications inflammation control surgical procedures injectables and answer questions      Subjective   Medications:   Home Medications:  Current Outpatient Medications on File Prior to Visit   Medication Sig   • Alcohol Swabs (PHARMACIST CHOICE ALCOHOL) pads 2 (Two) Times a Day. as directed   • alendronate (FOSAMAX) 70 MG tablet TAKE 1 (ONE) TABLET WEEKLY BEFORE BREAKFAST   • aspirin 325 MG tablet Take 325 mg by mouth Daily.   • BIOTIN PO Take  by mouth Daily.   • Calcium 500 MG tablet Take 600 mg by mouth 4 (Four) Times a Day.   • Cholecalciferol (VITAMIN D-1000 MAX ST) 25 MCG (1000 UT) tablet Take 1,000 Units by mouth Daily.   • citalopram (CeleXA) 10 MG tablet TAKE 3 TABLETS (30 MG) BY MOUTH ONCE DAILY DOSE INCREASE FROM 20 MG   • cyanocobalamin ([employer] VITAMIN B-12) 1000 MCG tablet Take  by mouth.   • ENULOSE 10 GM/15ML solution solution (encephalopathy) TAKE 1-2 TABLESPOONS (15-30 ML'S) 2-3 TIMES DAILY AS NEEDED   • fexofenadine (ALLEGRA) 180 MG tablet Take  by mouth.   • fluticasone (FLONASE) 50 MCG/ACT nasal spray INHALE 2 (TWO) SPRAYS IN EACH NOSTRIL ONCE DAILY   • FREESTYLE LITE test strip 2 (Two) Times a Day. use for testing   • glucose blood (ONE TOUCH ULTRA TEST) test strip TEST TWICE DAILY AS  DIRECTED   • glucose blood test strip TEST TWICE DAILY AS DIRECTED   • guaiFENesin 200 MG tablet Take 400 mg by mouth.   • hydrocortisone 2.5 % cream APPLY A SMALL AMOUNT TO RECTUM TWICE DAILY AS NEEDED FOR 10 DAYS   • ketotifen (ZADITOR) 0.025 % ophthalmic solution Apply  to eye(s) as directed by provider.   • lactulose (CHRONULAC) 10 GM/15ML solution    • Lancet Devices (SIMPLE DIAGNOSTICS LANCING DEV) misc 2 (Two) Times a Day. as directed   • levothyroxine (SYNTHROID, LEVOTHROID) 88 MCG tablet TAKE 1 (ONE) TABLET BY MOUTH ONCE DAILY ON EMPTY STOMACH   • magnesium chloride ER 64 MG DR tablet Take  by mouth.   • olopatadine (PATADAY) 0.2 % solution ophthalmic solution Apply 1 drop to eye(s) as directed by provider.   • Omega-3 Fatty Acids (FISH OIL PEARLS) 180 MG capsule Take  by mouth.   • omeprazole (priLOSEC) 20 MG capsule TAKE 1 (ONE) CAPSULE BY MOUTH ONCE DAILY   • PHARMACIST CHOICE LANCETS misc 2 (Two) Times a Day.   • pravastatin (PRAVACHOL) 10 MG tablet TAKE 1 (ONE) TABLET BY MOUTH EACH NIGHT AT BEDTIME   • PROBIOTIC PRODUCT PO Take 1 tablet by mouth Daily.   • tiZANidine (ZANAFLEX) 4 MG tablet TAKE 1/2 TABLET (2 MG) BY MOUTH AS NEEDED   • TRADJENTA 5 MG tablet tablet Take 5 mg by mouth Daily.   • Unable to find 400 mcg.     No current facility-administered medications on file prior to visit.      Current Medications:  Scheduled Meds:  Continuous Infusions:No current facility-administered medications for this visit.     PRN Meds:.    I have reviewed the patient's medical history in detail and updated the computerized patient record.  Review and summarization of old records includes:    Past Medical History:   Diagnosis Date   • Anxiety    • Cardiac disease    • Depression    • Diabetes (CMS/HCC)    • Osteoporosis    • Stroke (CMS/HCC)    • Thyroid activity decreased f        Past Surgical History:   Procedure Laterality Date   • DEEP BRAIN STIMULATOR PLACEMENT     • GALLBLADDER SURGERY     • TONSILLECTOMY    "       Social History     Occupational History   • Not on file   Tobacco Use   • Smoking status: Never Smoker   • Smokeless tobacco: Never Used   Substance and Sexual Activity   • Alcohol use: Never     Frequency: Never     Comment: No rehab involved. Don't like alcohol   • Drug use: Never   • Sexual activity: Not on file      Social History     Social History Narrative   • Not on file        Family History   Problem Relation Age of Onset   • Heart disease Mother    • Hypertension Mother    • Heart disease Father    • Hypertension Father        ROS: 14 point review of systems was performed and all other systems were reviewed and are negative except for documented findings in HPI and today's encounter.     Allergies:   Allergies   Allergen Reactions   • Codeine Nausea And Vomiting   • Cephalexin Rash     Constitutional:  Denies fever, shaking or chills   Eyes:  Denies change in visual acuity   HENT:  Denies nasal congestion or sore throat   Respiratory:  Denies cough or shortness of breath   Cardiovascular:  Denies chest pain or severe LE edema   GI:  Denies abdominal pain, nausea, vomiting, bloody stools or diarrhea   Musculoskeletal:  Numbness, tingling, pain, or loss of motor function only as noted above in history of present illness.  : Denies painful urination or hematuria  Integument:  Denies rash, lesion or ulceration   Neurologic:  Denies headache or focal weakness  Endocrine:  Denies lymphadenopathy  Psych:  Denies confusion or change in mental status   Hem:  Denies active bleeding    OBJECTIVE:  Physical Exam: 70 y.o. female  Wt Readings from Last 3 Encounters:   02/19/21 69.4 kg (153 lb)   11/04/20 68.9 kg (152 lb)   07/24/20 68 kg (150 lb)     Ht Readings from Last 1 Encounters:   02/19/21 174 cm (68.5\")     Body mass index is 22.93 kg/m².  Vitals:    02/19/21 0945   Temp: 97.6 °F (36.4 °C)     Vital signs reviewed.     General Appearance:    Alert, cooperative, in no acute distress                "   Eyes: conjunctiva clear  ENT: external ears and nose atraumatic  CV: no peripheral edema  Resp: normal respiratory effort  Skin: no rashes or wounds; normal turgor  Psych: mood and affect appropriate  Lymph: no nodes appreciated  Neuro: gross sensation intact  Vascular:  Palpable peripheral pulse in noted extremity  Musculoskeletal Extremities:   Left knee: Full extension. Flexion is to approximately 125 degrees. Good stability. Moderate swelling and synovitis. Medial joint line tenderness. Positive crepitation.   Right knee: Full extension. Flexion is 125 degrees. Not as swollen as the left knee. Positive crepitation. She ambulates with the use of a cane.    Radiology:   Three views of the bilateral knees including AP, lateral, and 40-degree PA views, were obtained and reviewed in the office today for pain. These demonstrated loss of joint space more in a varus pattern than a patellofemoral pattern, slightly worse on the left than the right. Comparison views from 1 year ago show less swelling but similar joint space.      Assessment:     ICD-10-CM ICD-9-CM   1. Bilateral primary osteoarthritis of knee  M17.0 715.16   2. Pain  R52 780.96        Large Joint Arthrocentesis: R knee  Date/Time: 2/19/2021 7:43 AM  Consent given by: patient  Site marked: site marked  Timeout: Immediately prior to procedure a time out was called to verify the correct patient, procedure, equipment, support staff and site/side marked as required   Supporting Documentation  Indications: pain and joint swelling   Procedure Details  Location: knee - R knee  Preparation: Patient was prepped and draped in the usual sterile fashion  Needle gauge: 21 G.  Approach: anterolateral  Medications administered: 80 mg methylPREDNISolone acetate 80 MG/ML; 4 mL lidocaine (cardiac)  Patient tolerance: patient tolerated the procedure well with no immediate complications    Large Joint Arthrocentesis: L knee  Date/Time: 2/19/2021 7:44 AM  Consent given by:  patient  Site marked: site marked  Timeout: Immediately prior to procedure a time out was called to verify the correct patient, procedure, equipment, support staff and site/side marked as required   Supporting Documentation  Indications: pain and joint swelling   Procedure Details  Location: knee - L knee  Preparation: Patient was prepped and draped in the usual sterile fashion  Needle gauge: 21 G.  Approach: anterolateral  Medications administered: 4 mL lidocaine (cardiac); 80 mg methylPREDNISolone acetate 80 MG/ML  Patient tolerance: patient tolerated the procedure well with no immediate complications             MDM/Plan:   The diagnosis(es), natural history, pathophysiology and treatment for diagnosis(es) were discussed. Opportunity given and questions answered.  Biomechanics of pertinent body areas discussed.  When appropriate, the use of ambulatory aids discussed.    The diagnosis(es), natural history, pathophysiology and treatment for diagnosis(es) were discussed. Opportunity given and questions answered.  Biomechanics of pertinent body areas discussed.  When appropriate, the use of ambulatory aids discussed.  Inflammation/pain control; with cold, heat, elevation and/or liniments discussed as appropriate  Cortisone Injection. See procedure note.    Scribed for Stevie Prasad MD by SANTIAGO TRAN.  2/19/2021  14:06 EST      I Stevie Prasad MD have personally performed the services described in this document as scribed by the above individual, and it is both accurate and complete.     Tisha Eduardo MA  2/19/2021  07:43 EST     2/19/2021

## 2021-03-02 DIAGNOSIS — Z23 IMMUNIZATION DUE: ICD-10-CM

## 2021-06-15 ENCOUNTER — CLINICAL SUPPORT (OUTPATIENT)
Dept: ORTHOPEDIC SURGERY | Facility: CLINIC | Age: 71
End: 2021-06-15

## 2021-06-15 VITALS — TEMPERATURE: 96 F | BODY MASS INDEX: 23.34 KG/M2 | RESPIRATION RATE: 18 BRPM | WEIGHT: 154 LBS | HEIGHT: 68 IN

## 2021-06-15 DIAGNOSIS — M17.0 BILATERAL PRIMARY OSTEOARTHRITIS OF KNEE: Primary | ICD-10-CM

## 2021-06-15 PROCEDURE — 20610 DRAIN/INJ JOINT/BURSA W/O US: CPT | Performed by: NURSE PRACTITIONER

## 2021-06-15 RX ORDER — METHYLPREDNISOLONE ACETATE 80 MG/ML
80 INJECTION, SUSPENSION INTRA-ARTICULAR; INTRALESIONAL; INTRAMUSCULAR; SOFT TISSUE
Status: COMPLETED | OUTPATIENT
Start: 2021-06-15 | End: 2021-06-15

## 2021-06-15 RX ADMIN — METHYLPREDNISOLONE ACETATE 80 MG: 80 INJECTION, SUSPENSION INTRA-ARTICULAR; INTRALESIONAL; INTRAMUSCULAR; SOFT TISSUE at 10:57

## 2021-06-15 RX ADMIN — METHYLPREDNISOLONE ACETATE 80 MG: 80 INJECTION, SUSPENSION INTRA-ARTICULAR; INTRALESIONAL; INTRAMUSCULAR; SOFT TISSUE at 10:58

## 2021-06-15 NOTE — PROGRESS NOTES
6/15/2021    Brenda Brown is here today for worsening knee pain. Pt has undergone injection of the knee in the past with good resolution of symptoms. Pt is requesting a repeat injection.    KNEE Injection Procedure Note:    Large Joint Arthrocentesis: R knee  Date/Time: 6/15/2021 10:57 AM  Consent given by: patient  Site marked: site marked  Timeout: Immediately prior to procedure a time out was called to verify the correct patient, procedure, equipment, support staff and site/side marked as required   Supporting Documentation  Indications: pain   Procedure Details  Location: knee - R knee  Preparation: Patient was prepped and draped in the usual sterile fashion  Needle gauge: 21G.  Approach: anterolateral  Medications administered: 80 mg methylPREDNISolone acetate 80 MG/ML; 4 mL lidocaine (cardiac)  Patient tolerance: patient tolerated the procedure well with no immediate complications    Large Joint Arthrocentesis: L knee  Date/Time: 6/15/2021 10:58 AM  Consent given by: patient  Site marked: site marked  Timeout: Immediately prior to procedure a time out was called to verify the correct patient, procedure, equipment, support staff and site/side marked as required   Supporting Documentation  Indications: pain   Procedure Details  Location: knee - L knee  Preparation: Patient was prepped and draped in the usual sterile fashion  Needle gauge: 21G.  Approach: anterolateral  Medications administered: 4 mL lidocaine (cardiac); 80 mg methylPREDNISolone acetate 80 MG/ML  Patient tolerance: patient tolerated the procedure well with no immediate complications        At the conclusion of the injection I discussed the importance of continued quad strengthening exercises on a daily basis. I will see the patient back if the symptoms should fail to improve or worsen.    OLEG Maya  6/15/2021

## 2021-06-29 ENCOUNTER — OFFICE (AMBULATORY)
Dept: URBAN - METROPOLITAN AREA CLINIC 75 | Facility: CLINIC | Age: 71
End: 2021-06-29

## 2021-06-29 VITALS — DIASTOLIC BLOOD PRESSURE: 72 MMHG | WEIGHT: 151 LBS | HEIGHT: 68 IN | SYSTOLIC BLOOD PRESSURE: 122 MMHG

## 2021-06-29 DIAGNOSIS — F45.8 OTHER SOMATOFORM DISORDERS: ICD-10-CM

## 2021-06-29 DIAGNOSIS — K58.9 IRRITABLE BOWEL SYNDROME WITHOUT DIARRHEA: ICD-10-CM

## 2021-06-29 DIAGNOSIS — K29.50 UNSPECIFIED CHRONIC GASTRITIS WITHOUT BLEEDING: ICD-10-CM

## 2021-06-29 DIAGNOSIS — K59.00 CONSTIPATION, UNSPECIFIED: ICD-10-CM

## 2021-06-29 DIAGNOSIS — R10.11 RIGHT UPPER QUADRANT PAIN: ICD-10-CM

## 2021-06-29 DIAGNOSIS — K63.89 OTHER SPECIFIED DISEASES OF INTESTINE: ICD-10-CM

## 2021-06-29 DIAGNOSIS — R10.13 EPIGASTRIC PAIN: ICD-10-CM

## 2021-06-29 DIAGNOSIS — K62.5 HEMORRHAGE OF ANUS AND RECTUM: ICD-10-CM

## 2021-06-29 DIAGNOSIS — K21.9 GASTRO-ESOPHAGEAL REFLUX DISEASE WITHOUT ESOPHAGITIS: ICD-10-CM

## 2021-06-29 DIAGNOSIS — K64.4 RESIDUAL HEMORRHOIDAL SKIN TAGS: ICD-10-CM

## 2021-06-29 DIAGNOSIS — R10.10 UPPER ABDOMINAL PAIN, UNSPECIFIED: ICD-10-CM

## 2021-06-29 PROCEDURE — 99214 OFFICE O/P EST MOD 30 MIN: CPT | Performed by: NURSE PRACTITIONER

## 2021-06-29 RX ORDER — LINACLOTIDE 145 UG/1
CAPSULE, GELATIN COATED ORAL
Qty: 90 | Refills: 2 | Status: COMPLETED
End: 2024-05-13

## 2021-08-24 ENCOUNTER — OFFICE (AMBULATORY)
Dept: URBAN - METROPOLITAN AREA CLINIC 75 | Facility: CLINIC | Age: 71
End: 2021-08-24

## 2021-08-24 VITALS
DIASTOLIC BLOOD PRESSURE: 80 MMHG | WEIGHT: 151 LBS | HEART RATE: 118 BPM | SYSTOLIC BLOOD PRESSURE: 110 MMHG | OXYGEN SATURATION: 96 % | HEIGHT: 68 IN

## 2021-08-24 DIAGNOSIS — K64.8 OTHER HEMORRHOIDS: ICD-10-CM

## 2021-08-24 DIAGNOSIS — K59.00 CONSTIPATION, UNSPECIFIED: ICD-10-CM

## 2021-08-24 DIAGNOSIS — R10.11 RIGHT UPPER QUADRANT PAIN: ICD-10-CM

## 2021-08-24 DIAGNOSIS — K21.9 GASTRO-ESOPHAGEAL REFLUX DISEASE WITHOUT ESOPHAGITIS: ICD-10-CM

## 2021-08-24 PROBLEM — K62.1 RECTAL POLYP: Status: ACTIVE | Noted: 2019-07-23

## 2021-08-24 PROBLEM — D12.2 BENIGN NEOPLASM OF ASCENDING COLON: Status: ACTIVE | Noted: 2019-07-23

## 2021-08-24 PROBLEM — D12.3 BENIGN NEOPLASM OF TRANSVERSE COLON: Status: ACTIVE | Noted: 2019-07-23

## 2021-08-24 PROCEDURE — 99214 OFFICE O/P EST MOD 30 MIN: CPT | Performed by: NURSE PRACTITIONER

## 2021-10-12 ENCOUNTER — CLINICAL SUPPORT (OUTPATIENT)
Dept: ORTHOPEDIC SURGERY | Facility: CLINIC | Age: 71
End: 2021-10-12

## 2021-10-12 VITALS — HEIGHT: 68 IN | BODY MASS INDEX: 23.19 KG/M2 | WEIGHT: 153 LBS | TEMPERATURE: 98 F

## 2021-10-12 DIAGNOSIS — M17.0 BILATERAL PRIMARY OSTEOARTHRITIS OF KNEE: Primary | ICD-10-CM

## 2021-10-12 PROCEDURE — 99213 OFFICE O/P EST LOW 20 MIN: CPT | Performed by: NURSE PRACTITIONER

## 2021-10-12 PROCEDURE — 20610 DRAIN/INJ JOINT/BURSA W/O US: CPT | Performed by: NURSE PRACTITIONER

## 2021-10-12 PROCEDURE — 73562 X-RAY EXAM OF KNEE 3: CPT | Performed by: NURSE PRACTITIONER

## 2021-10-12 RX ORDER — METHYLPREDNISOLONE ACETATE 80 MG/ML
80 INJECTION, SUSPENSION INTRA-ARTICULAR; INTRALESIONAL; INTRAMUSCULAR; SOFT TISSUE
Status: COMPLETED | OUTPATIENT
Start: 2021-10-12 | End: 2021-10-12

## 2021-10-12 RX ORDER — LINACLOTIDE 145 UG/1
CAPSULE, GELATIN COATED ORAL
COMMUNITY
Start: 2021-10-08

## 2021-10-12 RX ADMIN — METHYLPREDNISOLONE ACETATE 80 MG: 80 INJECTION, SUSPENSION INTRA-ARTICULAR; INTRALESIONAL; INTRAMUSCULAR; SOFT TISSUE at 13:23

## 2021-10-12 NOTE — PROGRESS NOTES
Patient Name: Brenda Brown   YOB: 1950  Referring Primary Care Physician: Lisa Camargo MD  BMI: Body mass index is 23.26 kg/m².    Chief Complaint:    Chief Complaint   Patient presents with   • Left Knee - Follow-up, Pain   • Right Knee - Follow-up, Pain        HPI:     Brenda Brown is a 71 y.o. female who presents today for evaluation of   Chief Complaint   Patient presents with   • Left Knee - Follow-up, Pain   • Right Knee - Follow-up, Pain   .  Patient presents for follow-up of bilateral knee pain.  She has known history of osteoarthritis in both of her knees and has pain, stiffness, swelling in both knees.  No locking or catching.  She feels like both of her knees are weak and that there can give out on her intermittently.  No new injury or trauma.  She takes Tylenol and uses ice, heat, liniments etc. for pain control.      Subjective   Medications:   Home Medications:  Current Outpatient Medications on File Prior to Visit   Medication Sig   • Alcohol Swabs (PHARMACIST CHOICE ALCOHOL) pads 2 (Two) Times a Day. as directed   • alendronate (FOSAMAX) 70 MG tablet TAKE 1 (ONE) TABLET WEEKLY BEFORE BREAKFAST   • aspirin 325 MG tablet Take 325 mg by mouth Daily.   • BIOTIN PO Take  by mouth Daily.   • Calcium 500 MG tablet Take 600 mg by mouth 4 (Four) Times a Day.   • Cholecalciferol (VITAMIN D-1000 MAX ST) 25 MCG (1000 UT) tablet Take 1,000 Units by mouth Daily.   • citalopram (CeleXA) 10 MG tablet TAKE 3 TABLETS (30 MG) BY MOUTH ONCE DAILY DOSE INCREASE FROM 20 MG   • cyanocobalamin ([employer] VITAMIN B-12) 1000 MCG tablet Take  by mouth.   • ENULOSE 10 GM/15ML solution solution (encephalopathy) TAKE 1-2 TABLESPOONS (15-30 ML'S) 2-3 TIMES DAILY AS NEEDED   • fexofenadine (ALLEGRA) 180 MG tablet Take  by mouth.   • fluticasone (FLONASE) 50 MCG/ACT nasal spray INHALE 2 (TWO) SPRAYS IN EACH NOSTRIL ONCE DAILY   • FREESTYLE LITE test strip 2 (Two) Times a Day. use for testing   • glucose blood  (ONE TOUCH ULTRA TEST) test strip TEST TWICE DAILY AS DIRECTED   • glucose blood test strip TEST TWICE DAILY AS DIRECTED   • guaiFENesin 200 MG tablet Take 400 mg by mouth.   • hydrocortisone 2.5 % cream APPLY A SMALL AMOUNT TO RECTUM TWICE DAILY AS NEEDED FOR 10 DAYS   • ketotifen (ZADITOR) 0.025 % ophthalmic solution Apply  to eye(s) as directed by provider.   • lactulose (CHRONULAC) 10 GM/15ML solution    • Lancet Devices (SIMPLE DIAGNOSTICS LANCING DEV) misc 2 (Two) Times a Day. as directed   • levothyroxine (SYNTHROID, LEVOTHROID) 88 MCG tablet TAKE 1 (ONE) TABLET BY MOUTH ONCE DAILY ON EMPTY STOMACH   • Linzess 145 MCG capsule capsule    • magnesium chloride ER 64 MG DR tablet Take  by mouth.   • olopatadine (PATADAY) 0.2 % solution ophthalmic solution Apply 1 drop to eye(s) as directed by provider.   • Omega-3 Fatty Acids (FISH OIL PEARLS) 180 MG capsule Take  by mouth.   • omeprazole (priLOSEC) 20 MG capsule TAKE 1 (ONE) CAPSULE BY MOUTH ONCE DAILY   • PHARMACIST CHOICE LANCETS misc 2 (Two) Times a Day.   • pravastatin (PRAVACHOL) 10 MG tablet TAKE 1 (ONE) TABLET BY MOUTH EACH NIGHT AT BEDTIME   • PROBIOTIC PRODUCT PO Take 1 tablet by mouth Daily.   • tiZANidine (ZANAFLEX) 4 MG tablet TAKE 1/2 TABLET (2 MG) BY MOUTH AS NEEDED   • TRADJENTA 5 MG tablet tablet Take 5 mg by mouth Daily.   • Unable to find 400 mcg.     No current facility-administered medications on file prior to visit.     Current Medications:  Scheduled Meds:  Continuous Infusions:No current facility-administered medications for this visit.    PRN Meds:.    I have reviewed the patient's medical history in detail and updated the computerized patient record.  Review and summarization of old records includes:    Past Medical History:   Diagnosis Date   • Anxiety    • Cardiac disease    • Depression    • Diabetes (HCC)    • Osteoporosis    • Stroke (HCC)    • Thyroid activity decreased f        Past Surgical History:   Procedure Laterality Date    • DEEP BRAIN STIMULATOR PLACEMENT     • GALLBLADDER SURGERY     • TONSILLECTOMY          Social History     Occupational History   • Not on file   Tobacco Use   • Smoking status: Never Smoker   • Smokeless tobacco: Never Used   Vaping Use   • Vaping Use: Never used   Substance and Sexual Activity   • Alcohol use: Never     Comment: No rehab involved. Don't like alcohol   • Drug use: Never   • Sexual activity: Not on file      Social History     Social History Narrative   • Not on file        Family History   Problem Relation Age of Onset   • Heart disease Mother    • Hypertension Mother    • Heart disease Father    • Hypertension Father        ROS: 14 point review of systems was performed and all other systems were reviewed and are negative except for documented findings in HPI and today's encounter.     Allergies:   Allergies   Allergen Reactions   • Codeine Nausea And Vomiting and GI Intolerance   • Topiramate Other (See Comments)     Outside Source Comment: %22eyes hurt%22  Outside Source Comment: %22eyes hurt%22     • Cefazolin Rash   • Cephalexin Rash     Constitutional:  Denies fever, shaking or chills   Eyes:  Denies change in visual acuity   HENT:  Denies nasal congestion or sore throat   Respiratory:  Denies cough or shortness of breath   Cardiovascular:  Denies chest pain or severe LE edema   GI:  Denies abdominal pain, nausea, vomiting, bloody stools or diarrhea   Musculoskeletal:  Numbness, tingling, pain, or loss of motor function only as noted above in history of present illness.  : Denies painful urination or hematuria  Integument:  Denies rash, lesion or ulceration   Neurologic:  Denies headache or focal weakness  Endocrine:  Denies lymphadenopathy  Psych:  Denies confusion or change in mental status   Hem:  Denies active bleeding    OBJECTIVE:  Physical Exam: 71 y.o. female  Wt Readings from Last 3 Encounters:   10/12/21 69.4 kg (153 lb)   06/15/21 69.9 kg (154 lb)   02/19/21 69.4 kg (153 lb)  "    Ht Readings from Last 1 Encounters:   10/12/21 172.7 cm (68\")     Body mass index is 23.26 kg/m².  Vitals:    10/12/21 1317   Temp: 98 °F (36.7 °C)     Vital signs reviewed.     General Appearance:    Alert, cooperative, in no acute distress                  Eyes: conjunctiva clear  ENT: external ears and nose atraumatic  CV: no peripheral edema  Resp: normal respiratory effort  Skin: no rashes or wounds; normal turgor  Psych: mood and affect appropriate  Lymph: no nodes appreciated  Neuro: gross sensation intact  Vascular:  Palpable peripheral pulse in noted extremity  Musculoskeletal Extremities: Bilateral knees have mild swelling, synovitis, medial lateral joint tenderness. Calves are soft nontender.  Skin is clean and intact with no rash or lesions    Radiology:   AP, lateral, 40 degree PA of bilateral knees obtained in the office today due to pain with prior comparison shows osteoarthritis of bilateral knees    Assessment:     ICD-10-CM ICD-9-CM   1. Bilateral primary osteoarthritis of knee  M17.0 715.16           MDM/Plan:   The diagnosis(es), natural history, pathophysiology and treatment for diagnosis(es) were discussed. Opportunity given and questions answered.  Biomechanics of pertinent body areas discussed.  When appropriate, the use of ambulatory aids discussed.  EXERCISES:  Advice on benefits of, and types of regular/moderate exercise pertaining to orthopedic diagnosis(es).  MEDICATIONS:  The risks, benefits, warnings,side effects and alternatives of medications discussed.  Inflammation/pain control; with cold, heat, elevation and/or liniments discussed as appropriate  Cortisone Injection. See procedure note.  PT referral.  Discussed treatment options with patient and she elects to proceed with cortisone injection in bilateral knees today.  I will go ahead and refer her to physical therapy as she feels like her knees are both weak and \"give out on her.\".  She can continue taking Tylenol and using " ice, heat, liniments etc.  We will see her back as needed.  Can repeat injections as soon as 3 months if helpful.    10/12/2021    Much of this encounter note is an electronic transcription/translation of spoken language to printed text. The electronic translation of spoken language may permit erroneous, or at times, nonsensical words or phrases to be inadvertently transcribed; Although I have reviewed the note for such errors, some may still exist       Large Joint Arthrocentesis: R knee  Date/Time: 10/12/2021 1:23 PM  Consent given by: patient  Site marked: site marked  Timeout: Immediately prior to procedure a time out was called to verify the correct patient, procedure, equipment, support staff and site/side marked as required   Supporting Documentation  Indications: pain   Procedure Details  Location: knee - R knee  Preparation: Patient was prepped and draped in the usual sterile fashion  Needle gauge: 21G.  Approach: anterolateral  Medications administered: 80 mg methylPREDNISolone acetate 80 MG/ML; 4 mL lidocaine (cardiac)  Patient tolerance: patient tolerated the procedure well with no immediate complications    Large Joint Arthrocentesis: L knee  Date/Time: 10/12/2021 1:23 PM  Consent given by: patient  Site marked: site marked  Timeout: Immediately prior to procedure a time out was called to verify the correct patient, procedure, equipment, support staff and site/side marked as required   Supporting Documentation  Indications: pain   Procedure Details  Location: knee - L knee  Preparation: Patient was prepped and draped in the usual sterile fashion  Needle gauge: 21G.  Approach: anterolateral  Medications administered: 4 mL lidocaine (cardiac); 80 mg methylPREDNISolone acetate 80 MG/ML  Patient tolerance: patient tolerated the procedure well with no immediate complications

## 2022-01-06 VITALS
OXYGEN SATURATION: 96 % | SYSTOLIC BLOOD PRESSURE: 113 MMHG | OXYGEN SATURATION: 99 % | HEART RATE: 62 BPM | OXYGEN SATURATION: 90 % | RESPIRATION RATE: 11 BRPM | HEART RATE: 79 BPM | HEART RATE: 78 BPM | HEART RATE: 55 BPM | OXYGEN SATURATION: 95 % | HEIGHT: 68 IN | RESPIRATION RATE: 17 BRPM | HEART RATE: 63 BPM | DIASTOLIC BLOOD PRESSURE: 76 MMHG | TEMPERATURE: 97.2 F | RESPIRATION RATE: 20 BRPM | OXYGEN SATURATION: 98 % | SYSTOLIC BLOOD PRESSURE: 131 MMHG | DIASTOLIC BLOOD PRESSURE: 55 MMHG | SYSTOLIC BLOOD PRESSURE: 158 MMHG | SYSTOLIC BLOOD PRESSURE: 117 MMHG | DIASTOLIC BLOOD PRESSURE: 65 MMHG | RESPIRATION RATE: 9 BRPM | DIASTOLIC BLOOD PRESSURE: 56 MMHG | RESPIRATION RATE: 13 BRPM | SYSTOLIC BLOOD PRESSURE: 141 MMHG | RESPIRATION RATE: 14 BRPM | DIASTOLIC BLOOD PRESSURE: 71 MMHG | HEART RATE: 67 BPM | WEIGHT: 150 LBS | DIASTOLIC BLOOD PRESSURE: 85 MMHG | RESPIRATION RATE: 5 BRPM | DIASTOLIC BLOOD PRESSURE: 80 MMHG | OXYGEN SATURATION: 100 % | HEART RATE: 52 BPM | SYSTOLIC BLOOD PRESSURE: 124 MMHG | HEART RATE: 80 BPM | SYSTOLIC BLOOD PRESSURE: 179 MMHG | HEART RATE: 69 BPM

## 2022-01-11 ENCOUNTER — OFFICE (AMBULATORY)
Dept: URBAN - METROPOLITAN AREA PATHOLOGY 4 | Facility: PATHOLOGY | Age: 72
End: 2022-01-11

## 2022-01-11 ENCOUNTER — AMBULATORY SURGICAL CENTER (AMBULATORY)
Dept: URBAN - METROPOLITAN AREA SURGERY 17 | Facility: SURGERY | Age: 72
End: 2022-01-11

## 2022-01-11 DIAGNOSIS — K29.70 GASTRITIS, UNSPECIFIED, WITHOUT BLEEDING: ICD-10-CM

## 2022-01-11 DIAGNOSIS — K31.7 POLYP OF STOMACH AND DUODENUM: ICD-10-CM

## 2022-01-11 DIAGNOSIS — K21.9 GASTRO-ESOPHAGEAL REFLUX DISEASE WITHOUT ESOPHAGITIS: ICD-10-CM

## 2022-01-11 DIAGNOSIS — K22.2 ESOPHAGEAL OBSTRUCTION: ICD-10-CM

## 2022-01-11 DIAGNOSIS — R13.10 DYSPHAGIA, UNSPECIFIED: ICD-10-CM

## 2022-01-11 DIAGNOSIS — K31.89 OTHER DISEASES OF STOMACH AND DUODENUM: ICD-10-CM

## 2022-01-11 LAB
GI HISTOLOGY: A. SELECT: (no result)
GI HISTOLOGY: B. UNSPECIFIED: (no result)
GI HISTOLOGY: PDF REPORT: (no result)

## 2022-01-11 PROCEDURE — 43239 EGD BIOPSY SINGLE/MULTIPLE: CPT | Mod: 59 | Performed by: INTERNAL MEDICINE

## 2022-01-11 PROCEDURE — 43249 ESOPH EGD DILATION <30 MM: CPT | Performed by: INTERNAL MEDICINE

## 2022-01-11 PROCEDURE — 88305 TISSUE EXAM BY PATHOLOGIST: CPT | Performed by: INTERNAL MEDICINE

## 2022-01-11 RX ORDER — OMEPRAZOLE 40 MG/1
40 CAPSULE, DELAYED RELEASE ORAL
Qty: 90 | Refills: 2 | Status: COMPLETED
End: 2024-07-25

## 2022-01-11 NOTE — SERVICEHPINOTES
I had the pleasure of seeing Ms. Mena. As you know, she is a very pleasant 70-year-old  female patient of Dr. Thayer with a history of GERD with esophagitis, IBS-C and colon polyps. She was last seen in our office 6/29/2021 with persistent constipation despite lactulose... started Linzess 145mcg QD. Here today for follow-up. With regards to her GERD she is taking omeprazole 20mg QD and TUMS 2x per week with good control. She denies dysphagia, odynophagia, nausea, vomiting, abdominal pain, bloating or distention.Her current bowel regimen is Lactulose 30cc BID and Linzess 145mcg QD and is able to have 2 BMs per day that are formed. Appetite and weight have been stable. She denies blood in her stool, melena, unexplained weight loss, diarrhea, fecal leaking or incontinence.Tried: famotidine (caused extra heart beats),She has younger sister - and is getting ready to move into same apartment complex (independent living)!!!DATA REVIEWED:brEGD 2/13/2013 nl esophagus and duodenum gastritis. bx w/ chronic gastritis, (-) H. pyloribrCN 3/3/2015 with 5 mm polyp in the cecum and external hemorrhoids (no pathology available for review)brCN 7/23/2019 as below bx w/ mucosal polyp ×1. tubular adenoma x2.brRUQ US 6/19/2020 normal
br
br    she had acute onset dysphagia last week swallowing large pills and ended up at the [facility] care [facility] and they recommended EGD.

## 2022-01-14 ENCOUNTER — TELEPHONE (OUTPATIENT)
Dept: ORTHOPEDIC SURGERY | Facility: CLINIC | Age: 72
End: 2022-01-14

## 2022-01-14 NOTE — TELEPHONE ENCOUNTER
Caller: Brenda Brown A    Relationship to patient: Self    Best call back number:[phone]    Patient is needing:PATIENT NEEDS TO RESCHEDULE HER INJECTION APPT ON 2/1/22

## 2022-02-09 ENCOUNTER — OFFICE VISIT (OUTPATIENT)
Dept: ORTHOPEDIC SURGERY | Facility: CLINIC | Age: 72
End: 2022-02-09

## 2022-02-09 VITALS — BODY MASS INDEX: 24.4 KG/M2 | TEMPERATURE: 96.9 F | WEIGHT: 161 LBS | HEIGHT: 68 IN

## 2022-02-09 DIAGNOSIS — M17.0 ARTHRITIS OF BOTH KNEES: Primary | ICD-10-CM

## 2022-02-09 PROCEDURE — 20610 DRAIN/INJ JOINT/BURSA W/O US: CPT | Performed by: ORTHOPAEDIC SURGERY

## 2022-02-09 RX ORDER — PANTOPRAZOLE SODIUM 40 MG/1
40 TABLET, DELAYED RELEASE ORAL
COMMUNITY

## 2022-02-09 RX ORDER — ATORVASTATIN CALCIUM 20 MG/1
20 TABLET, FILM COATED ORAL
COMMUNITY

## 2022-02-09 RX ORDER — METOPROLOL SUCCINATE 25 MG/1
12.5 TABLET, EXTENDED RELEASE ORAL
COMMUNITY

## 2022-02-09 RX ORDER — TRIAMCINOLONE ACETONIDE 40 MG/ML
80 INJECTION, SUSPENSION INTRA-ARTICULAR; INTRAMUSCULAR
Status: COMPLETED | OUTPATIENT
Start: 2022-02-09 | End: 2022-02-09

## 2022-02-09 RX ORDER — LISINOPRIL 2.5 MG/1
2.5 TABLET ORAL
COMMUNITY

## 2022-02-09 RX ADMIN — TRIAMCINOLONE ACETONIDE 80 MG: 40 INJECTION, SUSPENSION INTRA-ARTICULAR; INTRAMUSCULAR at 13:24

## 2022-02-09 NOTE — PROGRESS NOTES
Patient seen today for recurrent injections about bilateral knees which were performed  Large Joint Arthrocentesis: R knee  Date/Time: 2/9/2022 1:24 PM  Consent given by: patient  Site marked: site marked  Timeout: Immediately prior to procedure a time out was called to verify the correct patient, procedure, equipment, support staff and site/side marked as required   Supporting Documentation  Indications: pain   Procedure Details  Location: knee - R knee  Preparation: Patient was prepped and draped in the usual sterile fashion  Needle gauge: 21G.  Approach: anteromedial  Medications administered: 4 mL lidocaine (cardiac); 80 mg triamcinolone acetonide 40 MG/ML  Patient tolerance: patient tolerated the procedure well with no immediate complications    Large Joint Arthrocentesis: L knee  Date/Time: 2/9/2022 1:24 PM  Consent given by: patient  Site marked: site marked  Timeout: Immediately prior to procedure a time out was called to verify the correct patient, procedure, equipment, support staff and site/side marked as required   Supporting Documentation  Indications: pain   Procedure Details  Location: knee - L knee  Preparation: Patient was prepped and draped in the usual sterile fashion  Needle gauge: 21G.  Approach: anteromedial  Medications administered: 4 mL lidocaine (cardiac); 80 mg triamcinolone acetonide 40 MG/ML  Patient tolerance: patient tolerated the procedure well with no immediate complications

## 2022-06-13 ENCOUNTER — OFFICE (AMBULATORY)
Dept: URBAN - METROPOLITAN AREA CLINIC 75 | Facility: CLINIC | Age: 72
End: 2022-06-13

## 2022-06-13 VITALS
SYSTOLIC BLOOD PRESSURE: 125 MMHG | OXYGEN SATURATION: 96 % | WEIGHT: 153.2 LBS | HEIGHT: 68 IN | DIASTOLIC BLOOD PRESSURE: 80 MMHG | HEART RATE: 76 BPM

## 2022-06-13 DIAGNOSIS — Z86.010 PERSONAL HISTORY OF COLONIC POLYPS: ICD-10-CM

## 2022-06-13 DIAGNOSIS — R10.11 RIGHT UPPER QUADRANT PAIN: ICD-10-CM

## 2022-06-13 DIAGNOSIS — K21.9 GASTRO-ESOPHAGEAL REFLUX DISEASE WITHOUT ESOPHAGITIS: ICD-10-CM

## 2022-06-13 DIAGNOSIS — K58.1 IRRITABLE BOWEL SYNDROME WITH CONSTIPATION: ICD-10-CM

## 2022-06-13 PROCEDURE — 99214 OFFICE O/P EST MOD 30 MIN: CPT | Performed by: NURSE PRACTITIONER

## 2022-06-13 RX ORDER — LINACLOTIDE 145 UG/1
CAPSULE, GELATIN COATED ORAL
Qty: 90 | Refills: 2 | Status: COMPLETED
End: 2024-05-13

## 2022-06-13 RX ORDER — OMEPRAZOLE 40 MG/1
40 CAPSULE, DELAYED RELEASE ORAL
Qty: 90 | Refills: 2 | Status: COMPLETED
End: 2024-07-25

## 2022-06-13 RX ORDER — LACTULOSE 10 G/15ML
SOLUTION ORAL
Qty: 946 | Refills: 1 | Status: COMPLETED
End: 2023-08-11

## 2022-06-16 ENCOUNTER — CLINICAL SUPPORT (OUTPATIENT)
Dept: ORTHOPEDIC SURGERY | Facility: CLINIC | Age: 72
End: 2022-06-16

## 2022-06-16 VITALS — BODY MASS INDEX: 22.73 KG/M2 | WEIGHT: 150 LBS | HEIGHT: 68 IN | TEMPERATURE: 97.3 F

## 2022-06-16 DIAGNOSIS — M25.562 PAIN IN BOTH KNEES, UNSPECIFIED CHRONICITY: Primary | ICD-10-CM

## 2022-06-16 DIAGNOSIS — M17.0 PRIMARY OSTEOARTHRITIS OF BOTH KNEES: ICD-10-CM

## 2022-06-16 DIAGNOSIS — M25.561 PAIN IN BOTH KNEES, UNSPECIFIED CHRONICITY: Primary | ICD-10-CM

## 2022-06-16 PROCEDURE — 20610 DRAIN/INJ JOINT/BURSA W/O US: CPT | Performed by: NURSE PRACTITIONER

## 2022-06-16 PROCEDURE — 73562 X-RAY EXAM OF KNEE 3: CPT | Performed by: NURSE PRACTITIONER

## 2022-06-16 RX ORDER — LIDOCAINE HYDROCHLORIDE 20 MG/ML
4 INJECTION, SOLUTION EPIDURAL; INFILTRATION; INTRACAUDAL; PERINEURAL
Status: COMPLETED | OUTPATIENT
Start: 2022-06-16 | End: 2022-06-16

## 2022-06-16 RX ORDER — CETIRIZINE HYDROCHLORIDE 10 MG/1
10 TABLET ORAL DAILY
COMMUNITY

## 2022-06-16 RX ORDER — METHYLPREDNISOLONE ACETATE 80 MG/ML
80 INJECTION, SUSPENSION INTRA-ARTICULAR; INTRALESIONAL; INTRAMUSCULAR; SOFT TISSUE
Status: COMPLETED | OUTPATIENT
Start: 2022-06-16 | End: 2022-06-16

## 2022-06-16 RX ORDER — LANOLIN ALCOHOL/MO/W.PET/CERES
800 CREAM (GRAM) TOPICAL DAILY
COMMUNITY

## 2022-06-16 RX ADMIN — METHYLPREDNISOLONE ACETATE 80 MG: 80 INJECTION, SUSPENSION INTRA-ARTICULAR; INTRALESIONAL; INTRAMUSCULAR; SOFT TISSUE at 13:48

## 2022-06-16 RX ADMIN — LIDOCAINE HYDROCHLORIDE 4 ML: 20 INJECTION, SOLUTION EPIDURAL; INFILTRATION; INTRACAUDAL; PERINEURAL at 13:48

## 2022-06-16 NOTE — PROGRESS NOTES
Patient: Brenda Brown  YOB: 1950  Date of Service: 6/16/2022    Chief Complaints:   Chief Complaint   Patient presents with   • Left Knee - Pain, Follow-up   • Right Knee - Pain, Follow-up       Subjective: Patient Dr. Jesus here for knee pain to both knees desires conservative treatment    History of Present Illness: Pt is seen in the office today with complaints of   Chief Complaint   Patient presents with   • Left Knee - Pain, Follow-up   • Right Knee - Pain, Follow-up   .  KNEE: TIMING:  The pain is described as ACUTE.  LOCATION: medial joint line tenderness. AGGRAVATING FACTORS:  Is worsened by prolonged standing, sitting, walking and squatting activities.  ASSOCIATED SYMPTOMS:  swelling, tenderness, and aching.    This problem is new to this examiner.     Allergies:   Allergies   Allergen Reactions   • Codeine Nausea And Vomiting and GI Intolerance   • Topiramate Other (See Comments)     Outside Source Comment: %22eyes hurt%22  Outside Source Comment: %22eyes hurt%22     • Cefazolin Rash   • Cephalexin Rash       Medications:   Home Medications:  Current Outpatient Medications on File Prior to Visit   Medication Sig   • Alcohol Swabs (PHARMACIST CHOICE ALCOHOL) pads 2 (Two) Times a Day. as directed   • alendronate (FOSAMAX) 70 MG tablet TAKE 1 (ONE) TABLET WEEKLY BEFORE BREAKFAST   • aspirin 325 MG tablet Take 325 mg by mouth Daily.   • atorvastatin (LIPITOR) 20 MG tablet Take 20 mg by mouth.   • BIOTIN PO Take  by mouth Daily.   • Calcium 500 MG tablet Take 600 mg by mouth 4 (Four) Times a Day.   • cetirizine (zyrTEC) 10 MG tablet Take 10 mg by mouth Daily.   • cholecalciferol (Cholecalciferol) 25 MCG (1000 UT) tablet Take 1,000 Units by mouth Daily.   • citalopram (CeleXA) 10 MG tablet TAKE 3 TABLETS (30 MG) BY MOUTH ONCE DAILY DOSE INCREASE FROM 20 MG   • cyanocobalamin (VITAMIN B-12) 1000 MCG tablet Take  by mouth.   • ENULOSE 10 GM/15ML solution solution (encephalopathy) TAKE 1-2  TABLESPOONS (15-30 ML'S) 2-3 TIMES DAILY AS NEEDED   • fluticasone (FLONASE) 50 MCG/ACT nasal spray INHALE 2 (TWO) SPRAYS IN EACH NOSTRIL ONCE DAILY   • folic acid (FOLVITE) 400 MCG tablet Take 800 mcg by mouth Daily.   • FREESTYLE LITE test strip 2 (Two) Times a Day. use for testing   • glucose blood test strip TEST TWICE DAILY AS DIRECTED   • glucose blood test strip TEST TWICE DAILY AS DIRECTED   • hydrocortisone 2.5 % cream APPLY A SMALL AMOUNT TO RECTUM TWICE DAILY AS NEEDED FOR 10 DAYS   • ketotifen (ZADITOR) 0.025 % ophthalmic solution Apply  to eye(s) as directed by provider.   • lactulose (CHRONULAC) 10 GM/15ML solution    • Lancet Devices (SIMPLE DIAGNOSTICS LANCING DEV) misc 2 (Two) Times a Day. as directed   • levothyroxine (SYNTHROID, LEVOTHROID) 88 MCG tablet TAKE 1 (ONE) TABLET BY MOUTH ONCE DAILY ON EMPTY STOMACH   • Linzess 145 MCG capsule capsule    • lisinopril (PRINIVIL,ZESTRIL) 2.5 MG tablet Take 2.5 mg by mouth.   • magnesium chloride ER 64 MG DR tablet Take  by mouth.   • metoprolol succinate XL (TOPROL-XL) 25 MG 24 hr tablet Take 12.5 mg by mouth.   • olopatadine (PATADAY) 0.2 % solution ophthalmic solution Apply 1 drop to eye(s) as directed by provider.   • Omega-3 Fatty Acids (FISH OIL PEARLS) 180 MG capsule Take  by mouth.   • pantoprazole (PROTONIX) 40 MG EC tablet Take 40 mg by mouth.   • PHARMACIST CHOICE LANCETS misc 2 (Two) Times a Day.   • pravastatin (PRAVACHOL) 10 MG tablet TAKE 1 (ONE) TABLET BY MOUTH EACH NIGHT AT BEDTIME   • PROBIOTIC PRODUCT PO Take 1 tablet by mouth Daily.   • tiZANidine (ZANAFLEX) 4 MG tablet TAKE 1/2 TABLET (2 MG) BY MOUTH AS NEEDED   • TRADJENTA 5 MG tablet tablet Take 5 mg by mouth Daily.   • Unable to find 400 mcg.   • fexofenadine (ALLEGRA) 180 MG tablet Take  by mouth.   • guaiFENesin 200 MG tablet Take 400 mg by mouth.   • omeprazole (priLOSEC) 20 MG capsule TAKE 1 (ONE) CAPSULE BY MOUTH ONCE DAILY     No current facility-administered medications on  file prior to visit.     Current Medications:  Scheduled Meds:  Continuous Infusions:No current facility-administered medications for this visit.    PRN Meds:.    I have reviewed the patient's medical history in detail and updated the computerized patient record.  Review and summarization of old records include:    Past Medical History:   Diagnosis Date   • Anxiety    • Cardiac disease    • Depression    • Diabetes (HCC)    • Osteoporosis    • Stroke (HCC)    • Thyroid activity decreased f        Past Surgical History:   Procedure Laterality Date   • DEEP BRAIN STIMULATOR PLACEMENT     • GALLBLADDER SURGERY     • TONSILLECTOMY          Social History     Occupational History   • Not on file   Tobacco Use   • Smoking status: Never Smoker   • Smokeless tobacco: Never Used   Vaping Use   • Vaping Use: Never used   Substance and Sexual Activity   • Alcohol use: Never     Comment: No rehab involved. Don't like alcohol   • Drug use: Never   • Sexual activity: Not on file      Social History     Social History Narrative   • Not on file        Family History   Problem Relation Age of Onset   • Heart disease Mother    • Hypertension Mother    • Heart disease Father    • Hypertension Father        ROS: 14 point review of systems was performed and was negative except for documented findings in HPI and today's encounter.     Allergies:   Allergies   Allergen Reactions   • Codeine Nausea And Vomiting and GI Intolerance   • Topiramate Other (See Comments)     Outside Source Comment: %22eyes hurt%22  Outside Source Comment: %22eyes hurt%22     • Cefazolin Rash   • Cephalexin Rash     Constitutional:  Denies fever, shaking or chills   Eyes:  Denies change in visual acuity   HENT:  Denies nasal congestion or sore throat   Respiratory:  Denies cough or shortness of breath   Cardiovascular:  Denies chest pain or severe LE edema   GI:  Denies abdominal pain, nausea, vomiting, bloody stools or diarrhea   Musculoskeletal:  Numbness,  tingling, or loss of motor function only as noted above in history of present illness.  : Denies painful urination or hematuria  Integument:  Denies rash, lesion or ulceration   Neurologic:  Denies headache or focal weakness  Endocrine:  Denies lymphadenopathy  Psych:  Denies confusion or change in mental status   Hem:  Denies active bleeding      Physical Exam: 71 y.o. female  Wt Readings from Last 3 Encounters:   06/16/22 68 kg (150 lb)   02/09/22 73 kg (161 lb)   10/12/21 69.4 kg (153 lb)       Body mass index is 22.81 kg/m².  Facility age limit for growth percentiles is 20 years.  Vitals:    06/16/22 1413   Temp: 97.3 °F (36.3 °C)     Vital signs reviewed.   General Appearance:    Alert, cooperative, in no acute distress                  Eyes: conjunctiva clear  ENT: external ears and nose atraumatic  CV: no peripheral edema  Resp: normal respiratory effort  Skin: no rashes or wounds; normal turgor  Psych: mood and affect appropriate  Lymph: no nodes appreciated  Neuro: gross sensation intact  Vascular:  Palpable peripheral pulse in noted extremity  Musculoskeletal Extremities: KNEE Exam: medial joint line tenderness with crepitation, synovitis, swelling, and joint effusion bilateral knee.      Diagnostic Data:  Imaging done today and discussed with the patient:    Indication: pain related symptoms,  Views: 3V AP, LAT & 40 degree PA bilateral knee(s)   Findings: advanced arthritis  Comparison views: viewed last xray done in the office.      Procedure:  Large Joint Arthrocentesis: R knee  Date/Time: 6/16/2022 1:48 PM  Consent given by: patient  Site marked: site marked  Timeout: Immediately prior to procedure a time out was called to verify the correct patient, procedure, equipment, support staff and site/side marked as required   Supporting Documentation  Indications: pain, joint swelling and diagnostic evaluation   Procedure Details  Location: knee - R knee  Preparation: Patient was prepped and draped in the  usual sterile fashion  Needle gauge: 21G.  Medications administered: 80 mg methylPREDNISolone acetate 80 MG/ML; 4 mL lidocaine PF 2% 2 %  Patient tolerance: patient tolerated the procedure well with no immediate complications    Large Joint Arthrocentesis: L knee  Date/Time: 6/16/2022 1:48 PM  Consent given by: patient  Site marked: site marked  Timeout: Immediately prior to procedure a time out was called to verify the correct patient, procedure, equipment, support staff and site/side marked as required   Supporting Documentation  Indications: pain   Procedure Details  Location: knee - L knee  Preparation: Patient was prepped and draped in the usual sterile fashion  Needle gauge: 21G.  Medications administered: 80 mg methylPREDNISolone acetate 80 MG/ML; 4 mL lidocaine PF 2% 2 %  Patient tolerance: patient tolerated the procedure well with no immediate complications          Assessment:     ICD-10-CM ICD-9-CM   1. Pain in both knees, unspecified chronicity  M25.561 719.46    M25.562    2. Primary osteoarthritis of both knees  M17.0 715.16       Plan:   Follow up as indicated.  Ice, elevate, and rest as needed.  The diagnosis(es), natural history, pathophysiology and treatment for diagnosis(es) were discussed. Opportunity given and questions answered.  Biomechanics of pertinent body areas discussed.  When appropriate, the use of ambulatory aids discussed.  EXERCISES:  Advice on benefits of, and types of regular/moderate exercise pertaining to orthopedic diagnosis(es).  MEDICATIONS:  The risks, benefits, warnings,side effects and alternatives of medications discussed.  Inflammation/pain control; with cold, heat, elevation and/or liniments discussed as appropriate  Cortisone Injection. See procedure note.  MEDICAL RECORDS reviewed from other provider(s) for past and current medical history pertinent to this complaint.    6/16/2022

## 2022-07-11 ENCOUNTER — OFFICE (AMBULATORY)
Dept: URBAN - METROPOLITAN AREA CLINIC 75 | Facility: CLINIC | Age: 72
End: 2022-07-11

## 2022-07-11 VITALS
HEIGHT: 68 IN | OXYGEN SATURATION: 96 % | HEART RATE: 68 BPM | WEIGHT: 156.6 LBS | DIASTOLIC BLOOD PRESSURE: 64 MMHG | SYSTOLIC BLOOD PRESSURE: 102 MMHG

## 2022-07-11 DIAGNOSIS — Z86.010 PERSONAL HISTORY OF COLONIC POLYPS: ICD-10-CM

## 2022-07-11 DIAGNOSIS — K58.1 IRRITABLE BOWEL SYNDROME WITH CONSTIPATION: ICD-10-CM

## 2022-07-11 DIAGNOSIS — K21.9 GASTRO-ESOPHAGEAL REFLUX DISEASE WITHOUT ESOPHAGITIS: ICD-10-CM

## 2022-07-11 PROCEDURE — 99214 OFFICE O/P EST MOD 30 MIN: CPT | Performed by: NURSE PRACTITIONER

## 2022-07-11 RX ORDER — LACTULOSE 10 G/15ML
SOLUTION ORAL
Qty: 946 | Refills: 1 | Status: COMPLETED
End: 2023-08-11

## 2022-07-11 RX ORDER — LINACLOTIDE 145 UG/1
CAPSULE, GELATIN COATED ORAL
Qty: 90 | Refills: 2 | Status: COMPLETED
End: 2024-05-13

## 2022-07-11 RX ORDER — OMEPRAZOLE 40 MG/1
40 CAPSULE, DELAYED RELEASE ORAL
Qty: 90 | Refills: 2 | Status: COMPLETED
End: 2024-07-25

## 2022-10-04 ENCOUNTER — TELEPHONE (OUTPATIENT)
Dept: ORTHOPEDIC SURGERY | Facility: CLINIC | Age: 72
End: 2022-10-04

## 2022-10-04 NOTE — TELEPHONE ENCOUNTER
Caller: PATIENT    Relationship to patient: SELF     Best call back number: [phone]    Patient is needing: PATIENT RECEIVED A MESSAGE FROM YOUR OFFICE REGARDING PATIENT'S INJECTION ON 10.17.22 BEING RESCHEDULED UNTIL 10.25.22. PATIENT STATED THE NEW APPT THAT WAS SCHEDULED DOES NOT WORK FOR HER. I ATTEMPTED TO WARM TRANSFER CALL TO THE FRONT OFFICE FOR RESCHEDULING BUT THE PHONE WAS PICKED UP AND DISCONNECTED.

## 2022-10-27 ENCOUNTER — TELEPHONE (OUTPATIENT)
Dept: ORTHOPEDIC SURGERY | Facility: CLINIC | Age: 72
End: 2022-10-27

## 2022-10-27 NOTE — TELEPHONE ENCOUNTER
Caller: ADONAY COTA    Relationship to patient: SELF    Best call back number: [phone]    Chief complaint: BILATERAL KNEES    Type of visit: CORTISONE INJECTION    Requested date: 2ND WEEK OF NOVEMBER    If rescheduling, when is the original appointment: 11/1    Additional notes: PT HAS JURY DUTY

## 2022-12-02 ENCOUNTER — CLINICAL SUPPORT (OUTPATIENT)
Dept: ORTHOPEDIC SURGERY | Facility: CLINIC | Age: 72
End: 2022-12-02

## 2022-12-02 VITALS — TEMPERATURE: 96.9 F | BODY MASS INDEX: 22.73 KG/M2 | WEIGHT: 150 LBS | HEIGHT: 68 IN

## 2022-12-02 DIAGNOSIS — M17.0 PRIMARY OSTEOARTHRITIS OF BOTH KNEES: Primary | ICD-10-CM

## 2022-12-02 PROCEDURE — 20610 DRAIN/INJ JOINT/BURSA W/O US: CPT | Performed by: NURSE PRACTITIONER

## 2022-12-02 RX ORDER — LIDOCAINE HYDROCHLORIDE 10 MG/ML
1 INJECTION, SOLUTION EPIDURAL; INFILTRATION; INTRACAUDAL; PERINEURAL
Status: COMPLETED | OUTPATIENT
Start: 2022-12-02 | End: 2022-12-02

## 2022-12-02 RX ORDER — METHYLPREDNISOLONE ACETATE 80 MG/ML
80 INJECTION, SUSPENSION INTRA-ARTICULAR; INTRALESIONAL; INTRAMUSCULAR; SOFT TISSUE
Status: COMPLETED | OUTPATIENT
Start: 2022-12-02 | End: 2022-12-02

## 2022-12-02 RX ADMIN — METHYLPREDNISOLONE ACETATE 80 MG: 80 INJECTION, SUSPENSION INTRA-ARTICULAR; INTRALESIONAL; INTRAMUSCULAR; SOFT TISSUE at 15:54

## 2022-12-02 RX ADMIN — LIDOCAINE HYDROCHLORIDE 1 ML: 10 INJECTION, SOLUTION EPIDURAL; INFILTRATION; INTRACAUDAL; PERINEURAL at 15:54

## 2022-12-02 NOTE — PROGRESS NOTES
Brenda Brown is here today for worsening Bilateral knee pain. Knee injection was discussed with the patient in detail, including indication, risks, benefits, and alternatives. Verbal consent was given for the procedure. Injection site was aseptically prepared.      KNEE Injection Procedure Note:    Large Joint Arthrocentesis: R knee  Date/Time: 12/2/2022 3:54 PM  Consent given by: patient  Site marked: site marked  Timeout: Immediately prior to procedure a time out was called to verify the correct patient, procedure, equipment, support staff and site/side marked as required   Supporting Documentation  Indications: pain, joint swelling and diagnostic evaluation   Procedure Details  Location: knee - R knee  Preparation: Patient was prepped and draped in the usual sterile fashion  Needle gauge: 21G.  Medications administered: 80 mg methylPREDNISolone acetate 80 MG/ML; 1 mL lidocaine PF 1% 1 %  Patient tolerance: patient tolerated the procedure well with no immediate complications    Large Joint Arthrocentesis: L knee  Date/Time: 12/2/2022 3:54 PM  Consent given by: patient  Site marked: site marked  Timeout: Immediately prior to procedure a time out was called to verify the correct patient, procedure, equipment, support staff and site/side marked as required   Supporting Documentation  Indications: pain   Procedure Details  Location: knee - L knee  Preparation: Patient was prepped and draped in the usual sterile fashion  Needle gauge: 21G.  Medications administered: 80 mg methylPREDNISolone acetate 80 MG/ML; 1 mL lidocaine PF 1% 1 %  Patient tolerance: patient tolerated the procedure well with no immediate complications      Brenda Brown tolerated the procedure well. A Bandage was applied to the injection site. At the conclusion of the injection I discussed the importance of continued quad strengthening exercises on a daily basis. I will see the patient back if the symptoms should fail to improve or  worsen.    Monse Lane, APRN  12/2/2022    Dictated Utilizing Dragon Dictation

## 2023-03-24 ENCOUNTER — CLINICAL SUPPORT (OUTPATIENT)
Dept: ORTHOPEDIC SURGERY | Facility: CLINIC | Age: 73
End: 2023-03-24
Payer: MEDICARE

## 2023-03-24 VITALS — TEMPERATURE: 96.8 F | HEIGHT: 68 IN | WEIGHT: 155.6 LBS | BODY MASS INDEX: 23.58 KG/M2

## 2023-03-24 DIAGNOSIS — M17.0 PRIMARY OSTEOARTHRITIS OF BOTH KNEES: ICD-10-CM

## 2023-03-24 DIAGNOSIS — R52 PAIN: Primary | ICD-10-CM

## 2023-03-24 PROCEDURE — 73562 X-RAY EXAM OF KNEE 3: CPT | Performed by: NURSE PRACTITIONER

## 2023-03-24 PROCEDURE — 20610 DRAIN/INJ JOINT/BURSA W/O US: CPT | Performed by: NURSE PRACTITIONER

## 2023-03-24 RX ORDER — METHYLPREDNISOLONE ACETATE 80 MG/ML
80 INJECTION, SUSPENSION INTRA-ARTICULAR; INTRALESIONAL; INTRAMUSCULAR; SOFT TISSUE
Status: COMPLETED | OUTPATIENT
Start: 2023-03-24 | End: 2023-03-24

## 2023-03-24 RX ORDER — LIDOCAINE HYDROCHLORIDE 10 MG/ML
1 INJECTION, SOLUTION EPIDURAL; INFILTRATION; INTRACAUDAL; PERINEURAL
Status: COMPLETED | OUTPATIENT
Start: 2023-03-24 | End: 2023-03-24

## 2023-03-24 RX ADMIN — LIDOCAINE HYDROCHLORIDE 1 ML: 10 INJECTION, SOLUTION EPIDURAL; INFILTRATION; INTRACAUDAL; PERINEURAL at 08:01

## 2023-03-24 RX ADMIN — METHYLPREDNISOLONE ACETATE 80 MG: 80 INJECTION, SUSPENSION INTRA-ARTICULAR; INTRALESIONAL; INTRAMUSCULAR; SOFT TISSUE at 08:01

## 2023-03-24 NOTE — PROGRESS NOTES
Brenda Brown is here today for worsening Bilateral knee pain. Knee injection was discussed with the patient in detail, including indication, risks, benefits, and alternatives. Verbal consent was given for the procedure. Injection site was aseptically prepared.    Radiology:   AP, lateral, 40 degree PA of the bilateral knee obtained in the office today due to pain, with comparison views shows advanced tricompartmental degenerative changes, most significantly  patellofemoral compartment      KNEE Injection Procedure Note:    Large Joint Arthrocentesis: R knee  Date/Time: 3/24/2023 8:01 AM  Consent given by: patient  Site marked: site marked  Timeout: Immediately prior to procedure a time out was called to verify the correct patient, procedure, equipment, support staff and site/side marked as required   Supporting Documentation  Indications: pain, joint swelling and diagnostic evaluation   Procedure Details  Location: knee - R knee  Preparation: Patient was prepped and draped in the usual sterile fashion  Needle gauge: 21G.  Medications administered: 80 mg methylPREDNISolone acetate 80 MG/ML; 1 mL lidocaine PF 1% 1 %  Patient tolerance: patient tolerated the procedure well with no immediate complications    Large Joint Arthrocentesis: L knee  Date/Time: 3/24/2023 8:01 AM  Consent given by: patient  Site marked: site marked  Timeout: Immediately prior to procedure a time out was called to verify the correct patient, procedure, equipment, support staff and site/side marked as required   Supporting Documentation  Indications: pain   Procedure Details  Location: knee - L knee  Preparation: Patient was prepped and draped in the usual sterile fashion  Needle gauge: 21G.  Medications administered: 80 mg methylPREDNISolone acetate 80 MG/ML; 1 mL lidocaine PF 1% 1 %  Patient tolerance: patient tolerated the procedure well with no immediate complications      Brenda Brown tolerated the procedure well. A Bandage was  applied to the injection site. At the conclusion of the injection I discussed the importance of continued quad strengthening exercises on a daily basis. I will see the patient back if the symptoms should fail to improve or worsen.    Monse Lane, APRN  3/24/2023    Dictated Utilizing Dragon Dictation

## 2023-07-24 ENCOUNTER — CLINICAL SUPPORT (OUTPATIENT)
Dept: ORTHOPEDIC SURGERY | Facility: CLINIC | Age: 73
End: 2023-07-24
Payer: MEDICARE

## 2023-07-24 VITALS — TEMPERATURE: 97.5 F | WEIGHT: 153 LBS | BODY MASS INDEX: 23.19 KG/M2 | HEIGHT: 68 IN

## 2023-07-24 DIAGNOSIS — M17.0 PRIMARY OSTEOARTHRITIS OF BOTH KNEES: Primary | ICD-10-CM

## 2023-07-24 RX ORDER — LIDOCAINE HYDROCHLORIDE 10 MG/ML
2 INJECTION, SOLUTION EPIDURAL; INFILTRATION; INTRACAUDAL; PERINEURAL
Status: COMPLETED | OUTPATIENT
Start: 2023-07-24 | End: 2023-07-24

## 2023-07-24 RX ORDER — METHYLPREDNISOLONE ACETATE 80 MG/ML
80 INJECTION, SUSPENSION INTRA-ARTICULAR; INTRALESIONAL; INTRAMUSCULAR; SOFT TISSUE
Status: COMPLETED | OUTPATIENT
Start: 2023-07-24 | End: 2023-07-24

## 2023-07-24 RX ADMIN — LIDOCAINE HYDROCHLORIDE 2 ML: 10 INJECTION, SOLUTION EPIDURAL; INFILTRATION; INTRACAUDAL; PERINEURAL at 13:06

## 2023-07-24 RX ADMIN — METHYLPREDNISOLONE ACETATE 80 MG: 80 INJECTION, SUSPENSION INTRA-ARTICULAR; INTRALESIONAL; INTRAMUSCULAR; SOFT TISSUE at 13:06

## 2023-07-24 NOTE — PROGRESS NOTES
Brenda Brown is here today for worsening Bilateral knee pain. Knee injection was discussed with the patient in detail, including indication, risks, benefits, and alternatives. Verbal consent was given for the procedure. Injection site was aseptically prepared.      KNEE Injection Procedure Note:    Large Joint Arthrocentesis: R knee  Date/Time: 7/24/2023 1:06 PM  Consent given by: patient  Site marked: site marked  Timeout: Immediately prior to procedure a time out was called to verify the correct patient, procedure, equipment, support staff and site/side marked as required   Supporting Documentation  Indications: pain, joint swelling and diagnostic evaluation   Procedure Details  Location: knee - R knee  Preparation: Patient was prepped and draped in the usual sterile fashion  Needle gauge: 21G.  Medications administered: 80 mg methylPREDNISolone acetate 80 MG/ML; 2 mL lidocaine PF 1% 1 %  Patient tolerance: patient tolerated the procedure well with no immediate complications      Large Joint Arthrocentesis: L knee  Date/Time: 7/24/2023 1:06 PM  Consent given by: patient  Site marked: site marked  Timeout: Immediately prior to procedure a time out was called to verify the correct patient, procedure, equipment, support staff and site/side marked as required   Supporting Documentation  Indications: pain   Procedure Details  Location: knee - L knee  Preparation: Patient was prepped and draped in the usual sterile fashion  Needle gauge: 21G.  Medications administered: 80 mg methylPREDNISolone acetate 80 MG/ML; 2 mL lidocaine PF 1% 1 %  Patient tolerance: patient tolerated the procedure well with no immediate complications     Brenda Brown tolerated the procedure well. A Bandage was applied to the injection site. At the conclusion of the injection I discussed the importance of continued quad strengthening exercises on a daily basis. I will see the patient back if the symptoms should fail to improve or  worsen.    Monse Lane, APRN  7/24/2023    Dictated Utilizing Dragon Dictation

## 2023-08-11 ENCOUNTER — OFFICE (AMBULATORY)
Dept: URBAN - METROPOLITAN AREA CLINIC 76 | Facility: CLINIC | Age: 73
End: 2023-08-11

## 2023-08-11 VITALS
SYSTOLIC BLOOD PRESSURE: 132 MMHG | DIASTOLIC BLOOD PRESSURE: 71 MMHG | OXYGEN SATURATION: 98 % | WEIGHT: 153 LBS | HEIGHT: 68 IN | HEART RATE: 72 BPM

## 2023-08-11 DIAGNOSIS — K58.9 IRRITABLE BOWEL SYNDROME WITHOUT DIARRHEA: ICD-10-CM

## 2023-08-11 DIAGNOSIS — K59.00 CONSTIPATION, UNSPECIFIED: ICD-10-CM

## 2023-08-11 DIAGNOSIS — Z86.010 PERSONAL HISTORY OF COLONIC POLYPS: ICD-10-CM

## 2023-08-11 DIAGNOSIS — K21.9 GASTRO-ESOPHAGEAL REFLUX DISEASE WITHOUT ESOPHAGITIS: ICD-10-CM

## 2023-08-11 DIAGNOSIS — Z76.0 ENCOUNTER FOR ISSUE OF REPEAT PRESCRIPTION: ICD-10-CM

## 2023-08-11 PROBLEM — K22.2 ESOPHAGEAL OBSTRUCTION: Status: ACTIVE | Noted: 2022-01-11

## 2023-08-11 PROCEDURE — 99214 OFFICE O/P EST MOD 30 MIN: CPT | Performed by: NURSE PRACTITIONER

## 2023-08-11 RX ORDER — OMEPRAZOLE 40 MG/1
40 CAPSULE, DELAYED RELEASE ORAL
Qty: 90 | Refills: 2 | Status: COMPLETED
End: 2024-07-25

## 2023-08-11 RX ORDER — LACTULOSE 10 G/15ML
SOLUTION ORAL
Qty: 600 | Refills: 6 | Status: ACTIVE

## 2023-08-11 RX ORDER — LINACLOTIDE 145 UG/1
CAPSULE, GELATIN COATED ORAL
Qty: 90 | Refills: 2 | Status: COMPLETED
End: 2024-05-13

## 2023-09-18 VITALS
HEART RATE: 65 BPM | DIASTOLIC BLOOD PRESSURE: 75 MMHG | RESPIRATION RATE: 26 BRPM | OXYGEN SATURATION: 100 % | SYSTOLIC BLOOD PRESSURE: 131 MMHG | SYSTOLIC BLOOD PRESSURE: 147 MMHG | HEART RATE: 57 BPM | DIASTOLIC BLOOD PRESSURE: 122 MMHG | HEART RATE: 81 BPM | HEART RATE: 60 BPM | SYSTOLIC BLOOD PRESSURE: 196 MMHG | DIASTOLIC BLOOD PRESSURE: 131 MMHG | OXYGEN SATURATION: 95 % | SYSTOLIC BLOOD PRESSURE: 187 MMHG | SYSTOLIC BLOOD PRESSURE: 125 MMHG | OXYGEN SATURATION: 94 % | DIASTOLIC BLOOD PRESSURE: 106 MMHG | WEIGHT: 150 LBS | DIASTOLIC BLOOD PRESSURE: 83 MMHG | RESPIRATION RATE: 15 BRPM | DIASTOLIC BLOOD PRESSURE: 78 MMHG | RESPIRATION RATE: 20 BRPM | RESPIRATION RATE: 21 BRPM | SYSTOLIC BLOOD PRESSURE: 133 MMHG | OXYGEN SATURATION: 97 % | DIASTOLIC BLOOD PRESSURE: 77 MMHG | SYSTOLIC BLOOD PRESSURE: 164 MMHG | SYSTOLIC BLOOD PRESSURE: 175 MMHG | RESPIRATION RATE: 16 BRPM | TEMPERATURE: 97.2 F | HEIGHT: 68 IN | RESPIRATION RATE: 13 BRPM | HEART RATE: 58 BPM | HEART RATE: 74 BPM | DIASTOLIC BLOOD PRESSURE: 101 MMHG | RESPIRATION RATE: 14 BRPM

## 2023-09-22 ENCOUNTER — AMBULATORY SURGICAL CENTER (AMBULATORY)
Dept: URBAN - METROPOLITAN AREA SURGERY 17 | Facility: SURGERY | Age: 73
End: 2023-09-22

## 2023-09-22 DIAGNOSIS — K31.7 POLYP OF STOMACH AND DUODENUM: ICD-10-CM

## 2023-09-22 DIAGNOSIS — R13.10 DYSPHAGIA, UNSPECIFIED: ICD-10-CM

## 2023-09-22 DIAGNOSIS — K31.89 OTHER DISEASES OF STOMACH AND DUODENUM: ICD-10-CM

## 2023-09-22 DIAGNOSIS — K22.2 ESOPHAGEAL OBSTRUCTION: ICD-10-CM

## 2023-09-22 PROCEDURE — 43249 ESOPH EGD DILATION <30 MM: CPT | Performed by: INTERNAL MEDICINE

## 2023-09-22 NOTE — SERVICEHPINOTES
Ms. Mena  Is a 72-year-old female with history of GERD, esophagitis, IBS - constipation, and colon polyps who presents today for her annual follow-up visit.  She reports having 2 episodes of severe dysphagia with solid food over the past a couple of months.  She has required previous dilations in the past.  Her last EGD with dilation was in January 2022. she takes omeprazole 40 mg daily.  She denies breakthrough heartburn.  She has no nausea, vomiting, or melena.For constipation, she takes Linzess 145 mcg and lactulose   Twice daily.  This regimen works well for her.  She has rare diarrhea.  She denies hematochezia.  She has no abdominal pain.  Next colonoscopy is due July 2024.DATA REVIEWED:EGD 2/13/2013 nl esophagus and duodenum gastritis. bx w/ chronic gastritis, (-) H. pyloribrCN 3/3/2015 with 5 mm polyp in the cecum and external hemorrhoids (no pathology available for review)brCN 7/23/2019 as below bx w/ mucosal polyp ×1. tubular adenoma x2. Next CN due 7/2024brRUQ US 6/19/2020 normal1/11/2022 1:30 PM - EGD-gastritis, dilated GE and upper esophagus, fundic gland polyp. Omeprazole increased from 20 mg to 40 mg.br4/17/23: CBC, CMP, FL P, thyroid, Hgb A1c unremarkable

## 2023-11-02 ENCOUNTER — CLINICAL SUPPORT (OUTPATIENT)
Dept: ORTHOPEDIC SURGERY | Facility: CLINIC | Age: 73
End: 2023-11-02
Payer: MEDICARE

## 2023-11-02 VITALS — TEMPERATURE: 96.9 F | BODY MASS INDEX: 23.32 KG/M2 | WEIGHT: 153.89 LBS | HEIGHT: 68 IN

## 2023-11-02 DIAGNOSIS — R52 PAIN: Primary | ICD-10-CM

## 2023-11-02 DIAGNOSIS — M17.0 PRIMARY OSTEOARTHRITIS OF BOTH KNEES: ICD-10-CM

## 2023-11-02 RX ORDER — LIDOCAINE HYDROCHLORIDE 10 MG/ML
2 INJECTION, SOLUTION EPIDURAL; INFILTRATION; INTRACAUDAL; PERINEURAL
Status: COMPLETED | OUTPATIENT
Start: 2023-11-02 | End: 2023-11-02

## 2023-11-02 RX ORDER — METHYLPREDNISOLONE ACETATE 80 MG/ML
80 INJECTION, SUSPENSION INTRA-ARTICULAR; INTRALESIONAL; INTRAMUSCULAR; SOFT TISSUE
Status: COMPLETED | OUTPATIENT
Start: 2023-11-02 | End: 2023-11-02

## 2023-11-02 RX ADMIN — METHYLPREDNISOLONE ACETATE 80 MG: 80 INJECTION, SUSPENSION INTRA-ARTICULAR; INTRALESIONAL; INTRAMUSCULAR; SOFT TISSUE at 10:33

## 2023-11-02 RX ADMIN — LIDOCAINE HYDROCHLORIDE 2 ML: 10 INJECTION, SOLUTION EPIDURAL; INFILTRATION; INTRACAUDAL; PERINEURAL at 10:33

## 2023-11-02 NOTE — PROGRESS NOTES
Brenda Brown is here today for worsening Bilateral knee pain. Knee injection was discussed with the patient in detail, including indication, risks, benefits, and alternatives. Verbal consent was given for the procedure. Injection site was aseptically prepared.    Radiology:   AP, lateral, 40 degree PA of the bilateral knee obtained in the office today due to pain, with comparison views shows advanced tricompartmental degenerative changes, most significantly  medial and patellofemoral compartment with osteophyte formation and subchondral sclerosis      KNEE Injection Procedure Note:    Large Joint Arthrocentesis: R knee  Date/Time: 11/2/2023 10:33 AM  Consent given by: patient  Site marked: site marked  Timeout: Immediately prior to procedure a time out was called to verify the correct patient, procedure, equipment, support staff and site/side marked as required   Supporting Documentation  Indications: pain, joint swelling and diagnostic evaluation   Procedure Details  Location: knee - R knee  Preparation: Patient was prepped and draped in the usual sterile fashion  Needle gauge: 21G.  Approach: anterolateral  Medications administered: 80 mg methylPREDNISolone acetate 80 MG/ML; 2 mL lidocaine PF 1% 1 %  Patient tolerance: patient tolerated the procedure well with no immediate complications      Large Joint Arthrocentesis: L knee  Date/Time: 11/2/2023 10:33 AM  Consent given by: patient  Site marked: site marked  Timeout: Immediately prior to procedure a time out was called to verify the correct patient, procedure, equipment, support staff and site/side marked as required   Supporting Documentation  Indications: pain   Procedure Details  Location: knee - L knee  Preparation: Patient was prepped and draped in the usual sterile fashion  Needle gauge: 21G.  Approach: anterolateral  Medications administered: 80 mg methylPREDNISolone acetate 80 MG/ML; 2 mL lidocaine PF 1% 1 %  Patient tolerance: patient tolerated the  procedure well with no immediate complications      Brenda Brown tolerated the procedure well. A Bandage was applied to the injection site. At the conclusion of the injection I discussed the importance of continued quad strengthening exercises on a daily basis. I will see the patient back if the symptoms should fail to improve or worsen.    Monse Lane, APRN  11/2/2023    Dictated Utilizing Dragon Dictation

## 2023-11-12 VITALS
HEIGHT: 68 IN | SYSTOLIC BLOOD PRESSURE: 134 MMHG | DIASTOLIC BLOOD PRESSURE: 99 MMHG | WEIGHT: 150 LBS | HEART RATE: 65 BPM | OXYGEN SATURATION: 98 %

## 2023-11-13 ENCOUNTER — OFFICE (AMBULATORY)
Dept: URBAN - METROPOLITAN AREA CLINIC 76 | Facility: CLINIC | Age: 73
End: 2023-11-13

## 2023-11-13 DIAGNOSIS — K58.9 IRRITABLE BOWEL SYNDROME WITHOUT DIARRHEA: ICD-10-CM

## 2023-11-13 DIAGNOSIS — R13.10 DYSPHAGIA, UNSPECIFIED: ICD-10-CM

## 2023-11-13 DIAGNOSIS — K21.9 GASTRO-ESOPHAGEAL REFLUX DISEASE WITHOUT ESOPHAGITIS: ICD-10-CM

## 2023-11-13 PROCEDURE — 99214 OFFICE O/P EST MOD 30 MIN: CPT | Performed by: NURSE PRACTITIONER

## 2023-11-13 RX ORDER — LINACLOTIDE 145 UG/1
CAPSULE, GELATIN COATED ORAL
Qty: 90 | Refills: 2 | Status: COMPLETED
End: 2024-05-13

## 2023-11-13 RX ORDER — OMEPRAZOLE 40 MG/1
40 CAPSULE, DELAYED RELEASE ORAL
Qty: 90 | Refills: 2 | Status: COMPLETED
End: 2024-07-25

## 2023-11-13 RX ORDER — LACTULOSE 10 G/15ML
SOLUTION ORAL
Qty: 600 | Refills: 6 | Status: ACTIVE

## 2024-02-19 ENCOUNTER — CLINICAL SUPPORT (OUTPATIENT)
Dept: ORTHOPEDIC SURGERY | Facility: CLINIC | Age: 74
End: 2024-02-19
Payer: MEDICARE

## 2024-02-19 VITALS — HEIGHT: 68 IN | WEIGHT: 152.2 LBS | TEMPERATURE: 98.4 F | BODY MASS INDEX: 23.07 KG/M2

## 2024-02-19 DIAGNOSIS — M17.0 PRIMARY OSTEOARTHRITIS OF BOTH KNEES: Primary | ICD-10-CM

## 2024-02-19 PROCEDURE — 20610 DRAIN/INJ JOINT/BURSA W/O US: CPT | Performed by: NURSE PRACTITIONER

## 2024-02-19 RX ORDER — ASPIRIN 81 MG/1
81 TABLET ORAL DAILY
COMMUNITY

## 2024-02-19 RX ORDER — LIDOCAINE HYDROCHLORIDE 10 MG/ML
2 INJECTION, SOLUTION EPIDURAL; INFILTRATION; INTRACAUDAL; PERINEURAL
Status: COMPLETED | OUTPATIENT
Start: 2024-02-19 | End: 2024-02-19

## 2024-02-19 RX ORDER — METHYLPREDNISOLONE ACETATE 80 MG/ML
80 INJECTION, SUSPENSION INTRA-ARTICULAR; INTRALESIONAL; INTRAMUSCULAR; SOFT TISSUE
Status: COMPLETED | OUTPATIENT
Start: 2024-02-19 | End: 2024-02-19

## 2024-02-19 RX ORDER — NITROGLYCERIN 80 MG/1
1 PATCH TRANSDERMAL DAILY
COMMUNITY

## 2024-02-19 RX ORDER — CLOTRIMAZOLE 1 %
1 CREAM (GRAM) TOPICAL 2 TIMES DAILY
COMMUNITY

## 2024-02-19 RX ADMIN — LIDOCAINE HYDROCHLORIDE 2 ML: 10 INJECTION, SOLUTION EPIDURAL; INFILTRATION; INTRACAUDAL; PERINEURAL at 08:20

## 2024-02-19 RX ADMIN — METHYLPREDNISOLONE ACETATE 80 MG: 80 INJECTION, SUSPENSION INTRA-ARTICULAR; INTRALESIONAL; INTRAMUSCULAR; SOFT TISSUE at 08:20

## 2024-02-19 NOTE — PROGRESS NOTES
Brenda Brown is here today for worsening Bilateral knee pain. Knee injection was discussed with the patient in detail, including indication, risks, benefits, and alternatives. Verbal consent was given for the procedure. Injection site was aseptically prepared.      KNEE Injection Procedure Note:    Large Joint Arthrocentesis: R knee  Date/Time: 2/19/2024 8:20 AM  Consent given by: patient  Site marked: site marked  Timeout: Immediately prior to procedure a time out was called to verify the correct patient, procedure, equipment, support staff and site/side marked as required   Supporting Documentation  Indications: pain, joint swelling and diagnostic evaluation   Procedure Details  Location: knee - R knee  Preparation: Patient was prepped and draped in the usual sterile fashion  Needle gauge: 21G.  Approach: anterolateral  Medications administered: 80 mg methylPREDNISolone acetate 80 MG/ML; 2 mL lidocaine PF 1% 1 %  Patient tolerance: patient tolerated the procedure well with no immediate complications      Large Joint Arthrocentesis: L knee  Date/Time: 2/19/2024 8:20 AM  Consent given by: patient  Site marked: site marked  Timeout: Immediately prior to procedure a time out was called to verify the correct patient, procedure, equipment, support staff and site/side marked as required   Supporting Documentation  Indications: pain   Procedure Details  Location: knee - L knee  Preparation: Patient was prepped and draped in the usual sterile fashion  Needle gauge: 21G.  Approach: anterolateral  Medications administered: 80 mg methylPREDNISolone acetate 80 MG/ML; 2 mL lidocaine PF 1% 1 %  Patient tolerance: patient tolerated the procedure well with no immediate complications    Brenda Brown tolerated the procedure well. A Bandage was applied to the injection site. At the conclusion of the injection I discussed the importance of continued quad strengthening exercises on a daily basis. I will see the patient back if  the symptoms should fail to improve or worsen.    Monse Lane, APRN  2/19/2024    Dictated Utilizing Dragon Dictation

## 2024-04-22 ENCOUNTER — TELEPHONE (OUTPATIENT)
Dept: ORTHOPEDIC SURGERY | Facility: CLINIC | Age: 74
End: 2024-04-22

## 2024-04-22 NOTE — TELEPHONE ENCOUNTER
PATIENT REPORTS SWELLING ON OUTSIDE OF HER RIGHT KNEE SINCE 04-20-24, NO NEW INJURY SINCE 02-19-24 BILATERAL KNEE CORTISONE INJECTIONS & 11-02-23 XRs - DENIES REDNESS & WARMTH     PLEASE CALL / LEAVE VMAIL IF PATIENT NEEDS TO KEEP / HAS TO WAIT TILL UPCOMING 05/20 APPT w/[facility]? OR IF [facility] CAN CALL & RESCHEDULE SOONER?     (HUB STAFF STILL NOT APPROVED TO RE/SCHEDULE FOR [facility])     THANKS

## 2024-04-25 ENCOUNTER — OFFICE VISIT (OUTPATIENT)
Dept: ORTHOPEDIC SURGERY | Facility: CLINIC | Age: 74
End: 2024-04-25
Payer: MEDICARE

## 2024-04-25 VITALS — WEIGHT: 152.4 LBS | TEMPERATURE: 96.8 F | BODY MASS INDEX: 23.1 KG/M2 | HEIGHT: 68 IN

## 2024-04-25 DIAGNOSIS — R52 PAIN: Primary | ICD-10-CM

## 2024-04-25 DIAGNOSIS — M17.11 ARTHRITIS OF KNEE, RIGHT: ICD-10-CM

## 2024-04-25 RX ORDER — CITALOPRAM HYDROBROMIDE 10 MG/1
3 TABLET ORAL DAILY
COMMUNITY
Start: 2024-01-18

## 2024-04-25 NOTE — PROGRESS NOTES
Patient Name: Brenda Brown   YOB: 1950  Referring Primary Care Physician: Lisa Camargo MD  BMI: Body mass index is 23.17 kg/m².    Chief Complaint:    Chief Complaint   Patient presents with    Right Knee - Follow-up, Pain        HPI:     Brenda Brown is a 73 y.o. female who presents today for evaluation of   Chief Complaint   Patient presents with    Right Knee - Follow-up, Pain   .  Patient presents for evaluation of right knee swelling.  She denies any injuries but has noticed increased swelling on the lateral aspect of her right knee.  She has also had some thigh pain and ankle pain recently.  She has no other complaints at this time.      Subjective   Medications:   Home Medications:  Current Outpatient Medications on File Prior to Visit   Medication Sig    Alcohol Swabs (PHARMACIST CHOICE ALCOHOL) pads 2 (Two) Times a Day. as directed    aspirin 81 MG EC tablet Take 1 tablet by mouth Daily.    BIOTIN PO Take  by mouth Daily.    Calcium 500 MG tablet Take 600 mg by mouth 4 (Four) Times a Day.    cetirizine (zyrTEC) 10 MG tablet Take 1 tablet by mouth Daily.    cholecalciferol (Cholecalciferol) 25 MCG (1000 UT) tablet Take 1 tablet by mouth Daily.    citalopram (CeleXA) 10 MG tablet Take 3 tablets by mouth Daily.    clotrimazole (LOTRIMIN) 1 % cream Apply 1 Application topically to the appropriate area as directed 2 (Two) Times a Day.    cyanocobalamin (VITAMIN B-12) 1000 MCG tablet Take  by mouth.    Denosumab (PROLIA SC) Inject  under the skin into the appropriate area as directed.    ENULOSE 10 GM/15ML solution solution (encephalopathy) TAKE 1-2 TABLESPOONS (15-30 ML'S) 2-3 TIMES DAILY AS NEEDED    fluticasone (FLONASE) 50 MCG/ACT nasal spray INHALE 2 (TWO) SPRAYS IN EACH NOSTRIL ONCE DAILY    folic acid (FOLVITE) 400 MCG tablet Take 2 tablets by mouth Daily.    FREESTYLE LITE test strip 2 (Two) Times a Day. use for testing    glucose blood test strip     hydrocortisone 2.5 %  cream APPLY A SMALL AMOUNT TO RECTUM TWICE DAILY AS NEEDED FOR 10 DAYS    Lancet Devices (SIMPLE DIAGNOSTICS LANCING DEV) misc 2 (Two) Times a Day. as directed    levothyroxine (SYNTHROID, LEVOTHROID) 88 MCG tablet TAKE 1 (ONE) TABLET BY MOUTH ONCE DAILY ON EMPTY STOMACH    magnesium chloride ER 64 MG DR tablet Take  by mouth.    nitroglycerin (NITRODUR) 0.4 MG/HR patch Place 1 patch on the skin as directed by provider Daily. Tablet if needed    olopatadine (PATADAY) 0.2 % solution ophthalmic solution Apply 1 drop to eye(s) as directed by provider.    omeprazole (priLOSEC) 20 MG capsule TAKE 1 (ONE) CAPSULE BY MOUTH ONCE DAILY    pravastatin (PRAVACHOL) 10 MG tablet TAKE 1 (ONE) TABLET BY MOUTH EACH NIGHT AT BEDTIME    PROBIOTIC PRODUCT PO Take 1 tablet by mouth Daily.    tiZANidine (ZANAFLEX) 4 MG tablet TAKE 1/2 TABLET (2 MG) BY MOUTH AS NEEDED    [DISCONTINUED] Linzess 145 MCG capsule capsule     [DISCONTINUED] alendronate (FOSAMAX) 70 MG tablet TAKE 1 (ONE) TABLET WEEKLY BEFORE BREAKFAST (Patient not taking: Reported on 2/19/2024)    [DISCONTINUED] aspirin 325 MG tablet Take 1 tablet by mouth Daily. (Patient not taking: Reported on 2/19/2024)    [DISCONTINUED] atorvastatin (LIPITOR) 20 MG tablet Take 1 tablet by mouth. (Patient not taking: Reported on 2/19/2024)    [DISCONTINUED] citalopram (CeleXA) 10 MG tablet TAKE 3 TABLETS (30 MG) BY MOUTH ONCE DAILY DOSE INCREASE FROM 20 MG (Patient not taking: Reported on 2/19/2024)    [DISCONTINUED] fexofenadine (ALLEGRA) 180 MG tablet Take  by mouth. (Patient not taking: Reported on 2/19/2024)    [DISCONTINUED] glucose blood test strip TEST TWICE DAILY AS DIRECTED (Patient not taking: Reported on 2/19/2024)    [DISCONTINUED] guaiFENesin 200 MG tablet Take 2 tablets by mouth. (Patient not taking: Reported on 2/19/2024)    [DISCONTINUED] ketotifen (ZADITOR) 0.025 % ophthalmic solution Apply  to eye(s) as directed by provider. (Patient not taking: Reported on 2/19/2024)     [DISCONTINUED] lactulose (CHRONULAC) 10 GM/15ML solution  (Patient not taking: Reported on 2/19/2024)    [DISCONTINUED] lisinopril (PRINIVIL,ZESTRIL) 2.5 MG tablet Take 1 tablet by mouth. (Patient not taking: Reported on 2/19/2024)    [DISCONTINUED] metoprolol succinate XL (TOPROL-XL) 25 MG 24 hr tablet Take 12.5 mg by mouth. (Patient not taking: Reported on 11/2/2023)    [DISCONTINUED] Omega-3 Fatty Acids (FISH OIL PEARLS) 180 MG capsule Take  by mouth. (Patient not taking: Reported on 11/2/2023)    [DISCONTINUED] pantoprazole (PROTONIX) 40 MG EC tablet Take 1 tablet by mouth.    [DISCONTINUED] PHARMACIST CHOICE LANCETS misc 2 (Two) Times a Day.    [DISCONTINUED] TRADJENTA 5 MG tablet tablet Take 1 tablet by mouth Daily. (Patient not taking: Reported on 2/19/2024)    [DISCONTINUED] Unable to find 400 mcg. (Patient not taking: Reported on 2/19/2024)     No current facility-administered medications on file prior to visit.     Current Medications:  Scheduled Meds:  Continuous Infusions:No current facility-administered medications for this visit.    PRN Meds:.    I have reviewed the patient's medical history in detail and updated the computerized patient record.  Review and summarization of old records includes:    Past Medical History:   Diagnosis Date    Ankle sprain     Anxiety     Arthritis of back     Arthritis of neck     Cardiac disease     Depression     Diabetes     Fracture of wrist     Fracture, femur     Fracture, fibula     Fracture, radius     Frozen shoulder     Hip arthrosis     Knee swelling     Low back strain     Osteoporosis     Periarthritis of shoulder     Stroke     Tennis elbow     Thyroid activity decreased f        Past Surgical History:   Procedure Laterality Date    DEEP BRAIN STIMULATOR PLACEMENT      GALLBLADDER SURGERY      TONSILLECTOMY      WRIST SURGERY          Social History     Occupational History    Not on file   Tobacco Use    Smoking status: Never     Passive exposure: Never     Smokeless tobacco: Never    Tobacco comments:     stole a few from my mother when a child   Vaping Use    Vaping status: Never Used   Substance and Sexual Activity    Alcohol use: Never     Comment: No rehab involved. Don't like alcohol    Drug use: Never    Sexual activity: Not Currently     Partners: Male     Comment: Menopause      Social History     Social History Narrative    Not on file        Family History   Problem Relation Age of Onset    Heart disease Mother     Hypertension Mother     Osteoporosis Mother     Heart disease Father     Hypertension Father     Cancer Brother         Prostate       ROS: 14 point review of systems was performed and all other systems were reviewed and are negative except for documented findings in HPI and today's encounter.     Allergies:   Allergies   Allergen Reactions    Codeine Nausea And Vomiting and GI Intolerance    Topiramate Other (See Comments)     Outside Source Comment: %22eyes hurt%22  Outside Source Comment: %22eyes hurt%22      Cefazolin Rash    Cephalexin Rash     Constitutional:  Denies fever, shaking or chills   Eyes:  Denies change in visual acuity   HENT:  Denies nasal congestion or sore throat   Respiratory:  Denies cough or shortness of breath   Cardiovascular:  Denies chest pain or severe LE edema   GI:  Denies abdominal pain, nausea, vomiting, bloody stools or diarrhea   Musculoskeletal:  Numbness, tingling, pain, or loss of motor function only as noted above in history of present illness.  : Denies painful urination or hematuria  Integument:  Denies rash, lesion or ulceration   Neurologic:  Denies headache or focal weakness  Endocrine:  Denies lymphadenopathy  Psych:  Denies confusion or change in mental status   Hem:  Denies active bleeding    OBJECTIVE:  Physical Exam: 73 y.o. female  Wt Readings from Last 3 Encounters:   04/25/24 69.1 kg (152 lb 6.4 oz)   02/19/24 69 kg (152 lb 3.2 oz)   11/02/23 69.8 kg (153 lb 14.2 oz)     Ht Readings from Last 1  "Encounters:   04/25/24 172.7 cm (68\")     Body mass index is 23.17 kg/m².  Vitals:    04/25/24 0911   Temp: 96.8 °F (36 °C)     Vital signs reviewed.     General Appearance:    Alert, cooperative, in no acute distress                  Eyes: conjunctiva clear  ENT: external ears and nose atraumatic  CV: no peripheral edema  Resp: normal respiratory effort  Skin: no rashes or wounds; normal turgor  Psych: mood and affect appropriate  Lymph: no nodes appreciated  Neuro: gross sensation intact  Vascular:  Palpable peripheral pulse in noted extremity  Musculoskeletal Extremities: Exam today shows synovitis crepitation and swelling.  Most significantly on the lateral aspect of the right knee.  She has no tenderness to palpation of the right thigh or IT band.  Negative for tenderness palpation of the right greater trochanter, right SI or lumbar region.  Right hip noncontributory Stinchfield negative good internal and external rotation.    Radiology:    AP, lateral, 40 degree PA of the right knee obtained in the office today due to pain, with comparison views shows advanced tricompartmental degenerative changes, most significantly  medial and patellofemoral compartment with osteophyte formation and subchondral sclerosis    Assessment:     ICD-10-CM ICD-9-CM   1. Pain  R52 780.96   2. Arthritis of knee, right  M17.11 716.96       Plan:   - Discussed with the patient that I believe she flared up her right knee.  It is possibly that the chronic inflammation is also weak in her right thigh muscles and gives her some pain as well.  She has no tenderness to palpation along her low back but it could also be irritation of the low back nerves that is causing pain down her leg.  However the swelling is not of significance in order to drainage or any additional treatment at this point.  If she would like she can use a knee compression sleeve or an Ace wrap to help with the swelling.  Additionally she can use topical creams such as " IcyHot or Bengay.  She may use ice or heat and elevate.  We can see her back for her regular scheduled injections in about 1 month.  She verbalized understanding is in agreement this plan.      Monse Lane, APRN  4/25/2024    Dictated Utilizing Dragon Dictation

## 2024-05-13 ENCOUNTER — OFFICE (AMBULATORY)
Dept: URBAN - METROPOLITAN AREA CLINIC 76 | Facility: CLINIC | Age: 74
End: 2024-05-13

## 2024-05-13 VITALS
HEART RATE: 65 BPM | HEIGHT: 68 IN | WEIGHT: 145 LBS | DIASTOLIC BLOOD PRESSURE: 74 MMHG | SYSTOLIC BLOOD PRESSURE: 126 MMHG | OXYGEN SATURATION: 98 %

## 2024-05-13 DIAGNOSIS — R13.10 DYSPHAGIA, UNSPECIFIED: ICD-10-CM

## 2024-05-13 DIAGNOSIS — K21.9 GASTRO-ESOPHAGEAL REFLUX DISEASE WITHOUT ESOPHAGITIS: ICD-10-CM

## 2024-05-13 DIAGNOSIS — Z86.010 PERSONAL HISTORY OF COLONIC POLYPS: ICD-10-CM

## 2024-05-13 PROCEDURE — 99214 OFFICE O/P EST MOD 30 MIN: CPT

## 2024-05-13 RX ORDER — FAMOTIDINE 40 MG/1
40 TABLET, FILM COATED ORAL
Qty: 90 | Refills: 3 | Status: ACTIVE
Start: 2024-05-13

## 2024-05-22 ENCOUNTER — OFFICE VISIT (OUTPATIENT)
Dept: ORTHOPEDIC SURGERY | Facility: CLINIC | Age: 74
End: 2024-05-22
Payer: MEDICARE

## 2024-05-22 VITALS — WEIGHT: 152 LBS | TEMPERATURE: 98 F | HEIGHT: 68 IN | BODY MASS INDEX: 23.04 KG/M2

## 2024-05-22 DIAGNOSIS — M17.0 PRIMARY OSTEOARTHRITIS OF BOTH KNEES: Primary | ICD-10-CM

## 2024-05-22 RX ORDER — LACTULOSE 10 G/15ML
20 SOLUTION ORAL 2 TIMES DAILY PRN
COMMUNITY

## 2024-05-22 RX ORDER — METHYLPREDNISOLONE ACETATE 80 MG/ML
80 INJECTION, SUSPENSION INTRA-ARTICULAR; INTRALESIONAL; INTRAMUSCULAR; SOFT TISSUE
Status: COMPLETED | OUTPATIENT
Start: 2024-05-22 | End: 2024-05-22

## 2024-05-22 RX ORDER — LIDOCAINE HYDROCHLORIDE 10 MG/ML
2 INJECTION, SOLUTION EPIDURAL; INFILTRATION; INTRACAUDAL; PERINEURAL
Status: COMPLETED | OUTPATIENT
Start: 2024-05-22 | End: 2024-05-22

## 2024-05-22 RX ADMIN — LIDOCAINE HYDROCHLORIDE 2 ML: 10 INJECTION, SOLUTION EPIDURAL; INFILTRATION; INTRACAUDAL; PERINEURAL at 13:06

## 2024-05-22 RX ADMIN — METHYLPREDNISOLONE ACETATE 80 MG: 80 INJECTION, SUSPENSION INTRA-ARTICULAR; INTRALESIONAL; INTRAMUSCULAR; SOFT TISSUE at 13:06

## 2024-05-22 NOTE — PROGRESS NOTES
Brenda Brown is here today for worsening Bilateral knee pain. Knee injection was discussed with the patient in detail, including indication, risks, benefits, and alternatives. Verbal consent was given for the procedure. Injection site was aseptically prepared.      KNEE Injection Procedure Note:    Large Joint Arthrocentesis: R knee  Date/Time: 5/22/2024 1:06 PM  Consent given by: patient  Site marked: site marked  Timeout: Immediately prior to procedure a time out was called to verify the correct patient, procedure, equipment, support staff and site/side marked as required   Supporting Documentation  Indications: pain, joint swelling and diagnostic evaluation   Procedure Details  Location: knee - R knee  Preparation: Patient was prepped and draped in the usual sterile fashion  Needle gauge: 21G.  Medications administered: 80 mg methylPREDNISolone acetate 80 MG/ML; 2 mL lidocaine PF 1% 1 %  Patient tolerance: patient tolerated the procedure well with no immediate complications      Large Joint Arthrocentesis: L knee  Date/Time: 5/22/2024 1:06 PM  Consent given by: patient  Site marked: site marked  Timeout: Immediately prior to procedure a time out was called to verify the correct patient, procedure, equipment, support staff and site/side marked as required   Supporting Documentation  Indications: pain   Procedure Details  Location: knee - L knee  Preparation: Patient was prepped and draped in the usual sterile fashion  Needle gauge: 21G.  Medications administered: 80 mg methylPREDNISolone acetate 80 MG/ML; 2 mL lidocaine PF 1% 1 %  Patient tolerance: patient tolerated the procedure well with no immediate complications     Brenda Brown tolerated the procedure well. A Bandage was applied to the injection site. At the conclusion of the injection I discussed the importance of continued quad strengthening exercises on a daily basis. I will see the patient back if the symptoms should fail to improve or  worsen.    -Patient again mentions her right ankle pains.  But she wants to see if the shots help her walk better and reduces the pain in her ankle since its only been happening since her right knee flared up.  If she changes her mind and would like a referral to Dr. Altman for the right foot ankle and foot we can place this for her, she can just call the office and we can place this order.  She verbalized understanding is agreement this plan.    Monse Lane, APRN  5/22/2024    Dictated Utilizing Dragon Dictation

## 2024-07-23 VITALS
DIASTOLIC BLOOD PRESSURE: 74 MMHG | SYSTOLIC BLOOD PRESSURE: 129 MMHG | TEMPERATURE: 97 F | DIASTOLIC BLOOD PRESSURE: 70 MMHG | SYSTOLIC BLOOD PRESSURE: 160 MMHG | HEART RATE: 45 BPM | SYSTOLIC BLOOD PRESSURE: 130 MMHG | WEIGHT: 145 LBS | SYSTOLIC BLOOD PRESSURE: 149 MMHG | SYSTOLIC BLOOD PRESSURE: 117 MMHG | RESPIRATION RATE: 18 BRPM | RESPIRATION RATE: 15 BRPM | HEART RATE: 49 BPM | DIASTOLIC BLOOD PRESSURE: 66 MMHG | HEART RATE: 46 BPM | SYSTOLIC BLOOD PRESSURE: 111 MMHG | DIASTOLIC BLOOD PRESSURE: 59 MMHG | HEART RATE: 44 BPM | DIASTOLIC BLOOD PRESSURE: 76 MMHG | DIASTOLIC BLOOD PRESSURE: 65 MMHG | SYSTOLIC BLOOD PRESSURE: 114 MMHG | RESPIRATION RATE: 19 BRPM | TEMPERATURE: 97.1 F | RESPIRATION RATE: 11 BRPM | RESPIRATION RATE: 21 BRPM | OXYGEN SATURATION: 99 % | SYSTOLIC BLOOD PRESSURE: 113 MMHG | HEART RATE: 47 BPM | HEART RATE: 50 BPM | DIASTOLIC BLOOD PRESSURE: 57 MMHG | HEIGHT: 68 IN | SYSTOLIC BLOOD PRESSURE: 119 MMHG | RESPIRATION RATE: 17 BRPM | DIASTOLIC BLOOD PRESSURE: 58 MMHG | OXYGEN SATURATION: 100 % | HEART RATE: 48 BPM | HEART RATE: 53 BPM | SYSTOLIC BLOOD PRESSURE: 142 MMHG | RESPIRATION RATE: 16 BRPM

## 2024-07-25 ENCOUNTER — OFFICE (AMBULATORY)
Dept: URBAN - METROPOLITAN AREA PATHOLOGY 4 | Facility: PATHOLOGY | Age: 74
End: 2024-07-25
Payer: MEDICARE

## 2024-07-25 ENCOUNTER — AMBULATORY SURGICAL CENTER (AMBULATORY)
Dept: URBAN - METROPOLITAN AREA SURGERY 17 | Facility: SURGERY | Age: 74
End: 2024-07-25
Payer: MEDICARE

## 2024-07-25 DIAGNOSIS — K57.30 DIVERTICULOSIS OF LARGE INTESTINE WITHOUT PERFORATION OR ABS: ICD-10-CM

## 2024-07-25 DIAGNOSIS — D12.3 BENIGN NEOPLASM OF TRANSVERSE COLON: ICD-10-CM

## 2024-07-25 DIAGNOSIS — Z09 ENCOUNTER FOR FOLLOW-UP EXAMINATION AFTER COMPLETED TREATMEN: ICD-10-CM

## 2024-07-25 DIAGNOSIS — Z86.010 PERSONAL HISTORY OF COLONIC POLYPS: ICD-10-CM

## 2024-07-25 PROBLEM — K63.5 POLYP OF COLON: Status: ACTIVE | Noted: 2024-07-25

## 2024-07-25 LAB
GI HISTOLOGY: A. HEPATIC FLEXURE: (no result)
GI HISTOLOGY: PDF REPORT: (no result)

## 2024-07-25 PROCEDURE — 88305 TISSUE EXAM BY PATHOLOGIST: CPT | Performed by: PATHOLOGY

## 2024-07-25 PROCEDURE — 45385 COLONOSCOPY W/LESION REMOVAL: CPT | Mod: PT | Performed by: INTERNAL MEDICINE

## 2024-07-25 NOTE — SERVICEHPINOTES
Pt is a 73 year old female here today in follow-up for reflux, constipation.Her reflux is well controlled on Omeprazole 40mg daily. She denies breakthrough reflux. She denies dysphagia, melena, unexplained weight loss, or early satiety. She has a history of osteoporosis and is on Prolia. She has not, that she can remember, tried to come off her Omeprazole. She does have history of stricture in the GEJ dilated last in 9/23. She denies epigastric pain today. She is currently taking Lactulose twice daily and this works well for her. She stopped Linzess because of loose stool. She is having a formed bowel movement daily or every other day. She is due for CN. DATA REVIEWED:brEGD 2/13/2013 nl esophagus and duodenum gastritis. bx w/ chronic gastritis, (-) H. pyloribrCN 3/3/2015 with 5 mm polyp in the cecum and external hemorrhoids (no pathology available for review)brCN 7/23/2019 as below bx w/ mucosal polyp ×1. tubular adenoma x2. Next CN due 7/2024brRUQ US 6/19/2020 normal1/11/2022 1:30 PM - EGD-gastritis, dilated GE and upper esophagus, fundic gland polyp. Omeprazole increased from 20 mg to 40 mg.br4/17/23: CBC, CMP, FL P, thyroid, Hgb A1c unremarkablebr9/22/23: gastritis, stricture GE jxn dilated, no bx's

## 2024-08-22 ENCOUNTER — OFFICE VISIT (OUTPATIENT)
Dept: ORTHOPEDIC SURGERY | Facility: CLINIC | Age: 74
End: 2024-08-22
Payer: MEDICARE

## 2024-08-22 VITALS — WEIGHT: 154 LBS | BODY MASS INDEX: 23.34 KG/M2 | HEIGHT: 68 IN | TEMPERATURE: 97.9 F

## 2024-08-22 DIAGNOSIS — M17.0 PRIMARY OSTEOARTHRITIS OF BOTH KNEES: Primary | ICD-10-CM

## 2024-08-22 RX ORDER — LIDOCAINE HYDROCHLORIDE 10 MG/ML
2 INJECTION, SOLUTION EPIDURAL; INFILTRATION; INTRACAUDAL; PERINEURAL
Status: COMPLETED | OUTPATIENT
Start: 2024-08-22 | End: 2024-08-22

## 2024-08-22 RX ORDER — FAMOTIDINE 40 MG/1
40 TABLET, FILM COATED ORAL DAILY
COMMUNITY

## 2024-08-22 RX ORDER — METHYLPREDNISOLONE ACETATE 80 MG/ML
1 INJECTION, SUSPENSION INTRA-ARTICULAR; INTRALESIONAL; INTRAMUSCULAR; SOFT TISSUE
Status: COMPLETED | OUTPATIENT
Start: 2024-08-22 | End: 2024-08-22

## 2024-08-22 RX ADMIN — METHYLPREDNISOLONE ACETATE 1 ML: 80 INJECTION, SUSPENSION INTRA-ARTICULAR; INTRALESIONAL; INTRAMUSCULAR; SOFT TISSUE at 09:20

## 2024-08-22 RX ADMIN — LIDOCAINE HYDROCHLORIDE 2 ML: 10 INJECTION, SOLUTION EPIDURAL; INFILTRATION; INTRACAUDAL; PERINEURAL at 09:20

## 2024-08-22 NOTE — PROGRESS NOTES
Answers submitted by the patient for this visit:  Other (Submitted on 8/21/2024)  Please describe your symptoms.: Pain in both knees. Swelling in right knee. Pain in right foot.  Have you had these symptoms before?: Yes  How long have you been having these symptoms?: Greater than 2 weeks  Please list any medications you are currently taking for this condition.: Tylenol, Diclofenac sodium topical gel  Please describe any probable cause for these symptoms. : old age?  Primary Reason for Visit (Submitted on 8/21/2024)  What is the primary reason for your visit?: Other  Patient: Brenda Brown  YOB: 1950  Date of Service: 8/22/2024    Chief Complaints:   Chief Complaint   Patient presents with   • Left Knee - Follow-up   • Right Knee - Follow-up       Subjective:Pt of [facility] that presents with both knees painful and desires conservative treatment.    History of Present Illness: Pt is seen in the office today with complaints of   Chief Complaint   Patient presents with   • Left Knee - Follow-up   • Right Knee - Follow-up   .  KNEE: TIMING:  The pain is described as ACUTE on CHRONIC.  LOCATION: medial joint line tenderness. AGGRAVATING FACTORS:  Is worsened by prolonged standing, sitting, walking and squatting activities.  ASSOCIATED SYMPTOMS:  swelling, tenderness, and aching.    This problem is not new to this examiner.     Allergies:   Allergies   Allergen Reactions   • Codeine Nausea And Vomiting and GI Intolerance   • Topiramate Other (See Comments)     Outside Source Comment: %22eyes hurt%22  Outside Source Comment: %22eyes hurt%22     • Cefazolin Rash   • Cephalexin Rash       Medications:   Home Medications:  Current Outpatient Medications on File Prior to Visit   Medication Sig   • aspirin 81 MG EC tablet Take 1 tablet by mouth Daily.   • BIOTIN PO Take  by mouth Daily.   • Calcium 500 MG tablet Take 600 mg by mouth 4 (Four) Times a Day.   • cholecalciferol (Cholecalciferol) 25 MCG (1000 UT) tablet  Take 1 tablet by mouth Daily.   • citalopram (CeleXA) 10 MG tablet Take 3 tablets by mouth Daily.   • clotrimazole (LOTRIMIN) 1 % cream Apply 1 Application topically to the appropriate area as directed 2 (Two) Times a Day.   • cyanocobalamin (VITAMIN B-12) 1000 MCG tablet Take  by mouth.   • Denosumab (PROLIA SC) Inject  under the skin into the appropriate area as directed.   • famotidine (PEPCID) 40 MG tablet Take 1 tablet by mouth Daily.   • folic acid (FOLVITE) 400 MCG tablet Take 2 tablets by mouth Daily.   • FREESTYLE LITE test strip 2 (Two) Times a Day. use for testing   • glucose blood test strip    • hydrocortisone 2.5 % cream APPLY A SMALL AMOUNT TO RECTUM TWICE DAILY AS NEEDED FOR 10 DAYS   • lactulose (CHRONULAC) 10 GM/15ML solution Take 30 mL by mouth 2 (Two) Times a Day As Needed.   • Lancet Devices (SIMPLE DIAGNOSTICS LANCING DEV) misc 2 (Two) Times a Day. as directed   • levothyroxine (SYNTHROID, LEVOTHROID) 88 MCG tablet TAKE 1 (ONE) TABLET BY MOUTH ONCE DAILY ON EMPTY STOMACH   • magnesium chloride ER 64 MG DR tablet Take  by mouth.   • nitroglycerin (NITRODUR) 0.4 MG/HR patch Place 1 patch on the skin as directed by provider Daily. Tablet if needed   • pravastatin (PRAVACHOL) 10 MG tablet TAKE 1 (ONE) TABLET BY MOUTH EACH NIGHT AT BEDTIME   • PROBIOTIC PRODUCT PO Take 1 tablet by mouth Daily.   • tiZANidine (ZANAFLEX) 4 MG tablet TAKE 1/2 TABLET (2 MG) BY MOUTH AS NEEDED   • Alcohol Swabs (PHARMACIST CHOICE ALCOHOL) pads 2 (Two) Times a Day. as directed (Patient not taking: Reported on 8/22/2024)   • cetirizine (zyrTEC) 10 MG tablet Take 1 tablet by mouth Daily. (Patient not taking: Reported on 8/22/2024)   • ENULOSE 10 GM/15ML solution solution (encephalopathy) TAKE 1-2 TABLESPOONS (15-30 ML'S) 2-3 TIMES DAILY AS NEEDED (Patient not taking: Reported on 5/22/2024)   • fluticasone (FLONASE) 50 MCG/ACT nasal spray INHALE 2 (TWO) SPRAYS IN EACH NOSTRIL ONCE DAILY (Patient not taking: Reported on  8/22/2024)   • olopatadine (PATADAY) 0.2 % solution ophthalmic solution Apply 1 drop to eye(s) as directed by provider. (Patient not taking: Reported on 5/22/2024)   • omeprazole (priLOSEC) 20 MG capsule TAKE 1 (ONE) CAPSULE BY MOUTH ONCE DAILY (Patient not taking: Reported on 8/22/2024)   • Polyvinyl Alcohol-Povidone PF (HYPOTEARS) 1.4-0.6 % ophthalmic solution 1-2 drops As Needed for Wound Care. (Patient not taking: Reported on 8/22/2024)     No current facility-administered medications on file prior to visit.     Current Medications:  Scheduled Meds:  Continuous Infusions:No current facility-administered medications for this visit.    PRN Meds:.    I have reviewed the patient's medical history in detail and updated the computerized patient record.  Review and summarization of old records include:    Past Medical History:   Diagnosis Date   • Ankle sprain    • Anxiety    • Arthritis of back    • Arthritis of neck    • Cardiac disease    • Depression    • Diabetes    • Fracture of wrist    • Fracture, femur    • Fracture, fibula    • Fracture, radius    • Frozen shoulder    • Hip arthrosis    • Knee swelling    • Low back strain    • Osteoporosis    • Periarthritis of shoulder    • Stroke    • Tennis elbow    • Thyroid activity decreased f        Past Surgical History:   Procedure Laterality Date   • DEEP BRAIN STIMULATOR PLACEMENT     • GALLBLADDER SURGERY     • TONSILLECTOMY     • WRIST SURGERY          Social History     Occupational History   • Not on file   Tobacco Use   • Smoking status: Never     Passive exposure: Never   • Smokeless tobacco: Never   • Tobacco comments:     stole a few from my mother when a child   Vaping Use   • Vaping status: Never Used   Substance and Sexual Activity   • Alcohol use: Never     Comment: No rehab involved. Don't like alcohol   • Drug use: Never   • Sexual activity: Not Currently     Partners: Male     Comment: Menopause      Social History     Social History Narrative   •  Not on file        Family History   Problem Relation Age of Onset   • Heart disease Mother    • Hypertension Mother    • Osteoporosis Mother    • Heart disease Father    • Hypertension Father    • Cancer Brother         Prostate   • Diabetes Other        ROS: 14 point review of systems was performed and was negative except for documented findings in HPI and today's encounter.     Allergies:   Allergies   Allergen Reactions   • Codeine Nausea And Vomiting and GI Intolerance   • Topiramate Other (See Comments)     Outside Source Comment: %22eyes hurt%22  Outside Source Comment: %22eyes hurt%22     • Cefazolin Rash   • Cephalexin Rash     Constitutional:  Denies fever, shaking or chills   Eyes:  Denies change in visual acuity   HENT:  Denies nasal congestion or sore throat   Respiratory:  Denies cough or shortness of breath   Cardiovascular:  Denies chest pain or severe LE edema   GI:  Denies abdominal pain, nausea, vomiting, bloody stools or diarrhea   Musculoskeletal:  Numbness, tingling, or loss of motor function only as noted above in history of present illness.  : Denies painful urination or hematuria  Integument:  Denies rash, lesion or ulceration   Neurologic:  Denies headache or focal weakness  Endocrine:  Denies lymphadenopathy  Psych:  Denies confusion or change in mental status   Hem:  Denies active bleeding      Physical Exam: 73 y.o. female  Wt Readings from Last 3 Encounters:   08/22/24 69.9 kg (154 lb)   05/22/24 68.9 kg (152 lb)   04/25/24 69.1 kg (152 lb 6.4 oz)       Body mass index is 23.42 kg/m².  Facility age limit for growth %krzysztof is 20 years.  Vitals:    08/22/24 0854   Temp: 97.9 °F (36.6 °C)     Vital signs reviewed.   General Appearance:    Alert, cooperative, in no acute distress                  Eyes: conjunctiva clear  ENT: external ears and nose atraumatic  CV: no peripheral edema  Resp: normal respiratory effort  Skin: no rashes or wounds; normal turgor  Psych: mood and affect  appropriate  Lymph: no nodes appreciated  Neuro: gross sensation intact  Vascular:  Palpable peripheral pulse in noted extremity  Musculoskeletal Extremities: KNEE Exam: medial and lateral joint line tenderness with crepitation, synovitis, swelling, and joint effusion bilateral knee.      Diagnostic Data:  Imaging done previously in the office and discussed with the patient:       Procedure:  Large Joint Arthrocentesis: R knee  Date/Time: 8/22/2024 9:20 AM  Consent given by: patient  Site marked: site marked  Timeout: Immediately prior to procedure a time out was called to verify the correct patient, procedure, equipment, support staff and site/side marked as required   Supporting Documentation  Indications: pain   Procedure Details  Location: knee - R knee  Preparation: Patient was prepped and draped in the usual sterile fashion  Needle gauge: 21G.  Medications administered: 1 mL methylPREDNISolone acetate 80 MG/ML; 2 mL lidocaine PF 1% 1 %  Patient tolerance: patient tolerated the procedure well with no immediate complications      Large Joint Arthrocentesis: L knee  Date/Time: 8/22/2024 9:20 AM  Consent given by: patient  Site marked: site marked  Timeout: Immediately prior to procedure a time out was called to verify the correct patient, procedure, equipment, support staff and site/side marked as required   Supporting Documentation  Indications: pain   Procedure Details  Location: knee - L knee  Preparation: Patient was prepped and draped in the usual sterile fashion  Needle gauge: 21G.  Medications administered: 1 mL methylPREDNISolone acetate 80 MG/ML; 2 mL lidocaine PF 1% 1 %  Patient tolerance: patient tolerated the procedure well with no immediate complications      Assessment:     ICD-10-CM ICD-9-CM   1. Primary osteoarthritis of both knees  M17.0 715.16       Plan:   Follow up as indicated.  Ice, elevate, and rest as needed.  The diagnosis(es), natural history, pathophysiology and treatment for  diagnosis(es) were discussed. Opportunity given and questions answered.  Biomechanics of pertinent body areas discussed.  When appropriate, the use of ambulatory aids discussed.  EXERCISES:  Advice on benefits of, and types of regular/moderate exercise pertaining to orthopedic diagnosis(es).  MEDICATIONS:  The risks, benefits, warnings,side effects and alternatives of medications discussed.  Inflammation/pain control; with cold, heat, elevation and/or liniments discussed as appropriate  Cortisone Injection. See procedure note.  HOME EXERCISE/PT program encouraged  MEDICAL RECORDS reviewed from other provider(s) for past and current medical history pertinent to this complaint.    8/22/2024

## 2024-10-08 ENCOUNTER — OFFICE VISIT (OUTPATIENT)
Dept: ORTHOPEDIC SURGERY | Facility: CLINIC | Age: 74
End: 2024-10-08
Payer: MEDICARE

## 2024-10-08 VITALS — TEMPERATURE: 97.5 F | HEIGHT: 68 IN | BODY MASS INDEX: 22.73 KG/M2 | WEIGHT: 150 LBS

## 2024-10-08 DIAGNOSIS — M25.561 RIGHT KNEE PAIN, UNSPECIFIED CHRONICITY: Primary | ICD-10-CM

## 2024-10-08 DIAGNOSIS — M17.11 PRIMARY OSTEOARTHRITIS OF RIGHT KNEE: ICD-10-CM

## 2024-10-08 DIAGNOSIS — M81.0 OSTEOPOROSIS, UNSPECIFIED OSTEOPOROSIS TYPE, UNSPECIFIED PATHOLOGICAL FRACTURE PRESENCE: ICD-10-CM

## 2024-10-08 PROCEDURE — 99214 OFFICE O/P EST MOD 30 MIN: CPT | Performed by: NURSE PRACTITIONER

## 2024-10-08 RX ORDER — ACETAMINOPHEN 500 MG
TABLET ORAL EVERY 6 HOURS PRN
COMMUNITY

## 2024-10-08 NOTE — PROGRESS NOTES
Patient Name: Brenda Brown   YOB: 1950  Referring Primary Care Physician: Lisa Camargo MD  BMI: Body mass index is 22.81 kg/m².    Chief Complaint:    Chief Complaint   Patient presents with   • Right Knee - Follow-up        HPI: Presents with pain going down her leg into her foot is increasing in the right knee joint on and off for 6 weeks she has a history of osteoporosis she cannot go up and down stairs and she has been ambulate with a cane denies any acute injury or trauma.  Patient has tried rest ice elevation and has not received any relief she is having mechanical symptoms and feels like the knee is getting give out and lock  Discs are for cortisone injections at the end of August and pain has become severe and she cannot stand  Brenda Brown is a 74 y.o. female who presents today for evaluation of   Chief Complaint   Patient presents with   • Right Knee - Follow-up         Subjective   Medications:   Home Medications:  Current Outpatient Medications on File Prior to Visit   Medication Sig   • acetaminophen (TYLENOL) 500 MG tablet Take  by mouth Every 6 (Six) Hours As Needed.   • aspirin 81 MG EC tablet Take 1 tablet by mouth Daily.   • BIOTIN PO Take  by mouth Daily.   • Calcium 500 MG tablet Take 600 mg by mouth 4 (Four) Times a Day.   • cholecalciferol (Cholecalciferol) 25 MCG (1000 UT) tablet Take 1 tablet by mouth Daily.   • citalopram (CeleXA) 10 MG tablet Take 3 tablets by mouth Daily.   • clotrimazole (LOTRIMIN) 1 % cream Apply 1 Application topically to the appropriate area as directed 2 (Two) Times a Day.   • cyanocobalamin (VITAMIN B-12) 1000 MCG tablet Take  by mouth.   • Denosumab (PROLIA SC) Inject  under the skin into the appropriate area as directed.   • famotidine (PEPCID) 40 MG tablet Take 1 tablet by mouth Daily.   • folic acid (FOLVITE) 400 MCG tablet Take 2 tablets by mouth Daily.   • hydrocortisone 2.5 % cream APPLY A SMALL AMOUNT TO RECTUM TWICE DAILY AS  NEEDED FOR 10 DAYS   • lactulose (CHRONULAC) 10 GM/15ML solution Take 30 mL by mouth 2 (Two) Times a Day As Needed.   • levothyroxine (SYNTHROID, LEVOTHROID) 88 MCG tablet TAKE 1 (ONE) TABLET BY MOUTH ONCE DAILY ON EMPTY STOMACH   • magnesium chloride ER 64 MG DR tablet Take  by mouth.   • nitroglycerin (NITRODUR) 0.4 MG/HR patch Place 1 patch on the skin as directed by provider Daily. Tablet if needed   • pravastatin (PRAVACHOL) 10 MG tablet TAKE 1 (ONE) TABLET BY MOUTH EACH NIGHT AT BEDTIME   • PROBIOTIC PRODUCT PO Take 1 tablet by mouth Daily.   • tiZANidine (ZANAFLEX) 4 MG tablet TAKE 1/2 TABLET (2 MG) BY MOUTH AS NEEDED   • Alcohol Swabs (PHARMACIST CHOICE ALCOHOL) pads 2 (Two) Times a Day. as directed (Patient not taking: Reported on 8/22/2024)   • cetirizine (zyrTEC) 10 MG tablet Take 1 tablet by mouth Daily. (Patient not taking: Reported on 8/22/2024)   • ENULOSE 10 GM/15ML solution solution (encephalopathy) TAKE 1-2 TABLESPOONS (15-30 ML'S) 2-3 TIMES DAILY AS NEEDED (Patient not taking: Reported on 5/22/2024)   • fluticasone (FLONASE) 50 MCG/ACT nasal spray INHALE 2 (TWO) SPRAYS IN EACH NOSTRIL ONCE DAILY (Patient not taking: Reported on 8/22/2024)   • FREESTYLE LITE test strip 2 (Two) Times a Day. use for testing (Patient not taking: Reported on 10/8/2024)   • glucose blood test strip  (Patient not taking: Reported on 10/8/2024)   • Lancet Devices (SIMPLE DIAGNOSTICS LANCING DEV) misc 2 (Two) Times a Day. as directed (Patient not taking: Reported on 10/8/2024)   • olopatadine (PATADAY) 0.2 % solution ophthalmic solution Apply 1 drop to eye(s) as directed by provider. (Patient not taking: Reported on 5/22/2024)   • omeprazole (priLOSEC) 20 MG capsule TAKE 1 (ONE) CAPSULE BY MOUTH ONCE DAILY (Patient not taking: Reported on 8/22/2024)   • Polyvinyl Alcohol-Povidone PF (HYPOTEARS) 1.4-0.6 % ophthalmic solution 1-2 drops As Needed for Wound Care. (Patient not taking: Reported on 8/22/2024)     No current  facility-administered medications on file prior to visit.     Current Medications:  Scheduled Meds:  Continuous Infusions:No current facility-administered medications for this visit.    PRN Meds:.    I have reviewed the patient's medical history in detail and updated the computerized patient record.  Review and summarization of old records includes:    Past Medical History:   Diagnosis Date   • Ankle sprain    • Anxiety    • Arthritis of back    • Arthritis of neck    • Cardiac disease    • Depression    • Diabetes    • Fracture of wrist    • Fracture, femur    • Fracture, fibula    • Fracture, radius    • Frozen shoulder    • Hip arthrosis    • Knee swelling    • Low back strain    • Osteoporosis    • Periarthritis of shoulder    • Stroke    • Tennis elbow    • Thyroid activity decreased f        Past Surgical History:   Procedure Laterality Date   • DEEP BRAIN STIMULATOR PLACEMENT     • GALLBLADDER SURGERY     • TONSILLECTOMY     • WRIST SURGERY          Social History     Occupational History   • Not on file   Tobacco Use   • Smoking status: Never     Passive exposure: Never   • Smokeless tobacco: Never   • Tobacco comments:     stole a few from my mother when a child   Vaping Use   • Vaping status: Never Used   Substance and Sexual Activity   • Alcohol use: Never     Comment: No rehab involved. Don't like alcohol   • Drug use: Never   • Sexual activity: Not Currently     Partners: Male     Comment: Menopause      Social History     Social History Narrative   • Not on file        Family History   Problem Relation Age of Onset   • Heart disease Mother    • Hypertension Mother    • Osteoporosis Mother    • Heart disease Father    • Hypertension Father    • Cancer Brother         Prostate   • Diabetes Other        ROS: 14 point review of systems was performed and all other systems were reviewed and are negative except for documented findings in HPI and today's encounter.     Allergies:   Allergies   Allergen  "Reactions   • Codeine Nausea And Vomiting and GI Intolerance   • Topiramate Other (See Comments)     Outside Source Comment: %22eyes hurt%22  Outside Source Comment: %22eyes hurt%22     • Cefazolin Rash   • Cephalexin Rash     Constitutional:  Denies fever, shaking or chills   Eyes:  Denies change in visual acuity   HENT:  Denies nasal congestion or sore throat   Respiratory:  Denies cough or shortness of breath   Cardiovascular:  Denies chest pain or severe LE edema   GI:  Denies abdominal pain, nausea, vomiting, bloody stools or diarrhea   Musculoskeletal:  Numbness, tingling, pain, or loss of motor function only as noted above in history of present illness.  : Denies painful urination or hematuria  Integument:  Denies rash, lesion or ulceration   Neurologic:  Denies headache or focal weakness  Endocrine:  Denies lymphadenopathy  Psych:  Denies confusion or change in mental status   Hem:  Denies active bleeding    OBJECTIVE:  Physical Exam: 74 y.o. female  Wt Readings from Last 3 Encounters:   10/08/24 68 kg (150 lb)   08/22/24 69.9 kg (154 lb)   05/22/24 68.9 kg (152 lb)     Ht Readings from Last 1 Encounters:   10/08/24 172.7 cm (68\")     Body mass index is 22.81 kg/m².  Vitals:    10/08/24 1340   Temp: 97.5 °F (36.4 °C)     Vital signs reviewed.     General Appearance:    Alert, cooperative, in no acute distress                  Eyes: conjunctiva clear  ENT: external ears and nose atraumatic  CV: no peripheral edema  Resp: normal respiratory effort  Skin: no rashes or wounds; normal turgor  Psych: mood and affect appropriate  Lymph: no nodes appreciated  Neuro: gross sensation intact  Vascular:  Palpable peripheral pulse in noted extremity  Musculoskeletal Extremities: Skin warm dry intact with good pulses and sensation calf is soft and nontender she is positive Hernandez's tenderness over the medial aspect and tibial plateau point tenderness patellofemoral crepitation is noted calf is soft and nontender she " ambulates with an antalgic gait    Radiology:   Right knee 3 views done for pain with no comparison show tricompartmental osteoarthritis worse in the medial compartment    Assessment:     ICD-10-CM ICD-9-CM   1. Right knee pain, unspecified chronicity  M25.561 719.46   2. Osteoporosis, unspecified osteoporosis type, unspecified pathological fracture presence  M81.0 733.00   3. Primary osteoarthritis of right knee  M17.11 715.16        Procedures  History of osteoporosis her joint looks decent on plain x-ray concern for tibial plateau or a severe meniscal bucket-handle will get an MRI and have her offload and follow-up    MDM/Plan:   The diagnosis(es), natural history, pathophysiology and treatment for diagnosis(es) were discussed. Opportunity given and questions answered.  Biomechanics of pertinent body areas discussed.  When appropriate, the use of ambulatory aids discussed.  EXERCISES:  Advice on benefits of, and types of regular/moderate exercise pertaining to orthopedic diagnosis(es).  MEDICATIONS:  The risks, benefits, warnings,side effects and alternatives of medications discussed.  Inflammation/pain control; with cold, heat, elevation and/or liniments discussed as appropriate  MRI.  MEDICAL RECORDS reviewed from other provider(s) for past and current medical history pertinent to this complaint.      10/9/2024    Much of this encounter note is an electronic transcription/translation of spoken language to printed text. The electronic translation of spoken language may permit erroneous, or at times, nonsensical words or phrases to be inadvertently transcribed; Although I have reviewed the note for such errors, some may still exist

## 2024-10-18 ENCOUNTER — HOSPITAL ENCOUNTER (OUTPATIENT)
Dept: MRI IMAGING | Facility: HOSPITAL | Age: 74
Discharge: HOME | End: 2024-10-18
Payer: MEDICARE

## 2024-10-18 DIAGNOSIS — M25.561 RIGHT KNEE PAIN, UNSPECIFIED CHRONICITY: ICD-10-CM

## 2024-10-18 PROCEDURE — 73721 MRI JNT OF LWR EXTRE W/O DYE: CPT

## 2024-11-05 ENCOUNTER — OFFICE VISIT (OUTPATIENT)
Dept: ORTHOPEDIC SURGERY | Facility: CLINIC | Age: 74
End: 2024-11-05
Payer: MEDICARE

## 2024-11-05 VITALS — TEMPERATURE: 98.3 F | BODY MASS INDEX: 22.85 KG/M2 | WEIGHT: 150.8 LBS | HEIGHT: 68 IN

## 2024-11-05 DIAGNOSIS — S83.281A TEAR OF LATERAL MENISCUS OF RIGHT KNEE, UNSPECIFIED TEAR TYPE, UNSPECIFIED WHETHER OLD OR CURRENT TEAR, INITIAL ENCOUNTER: Primary | ICD-10-CM

## 2024-11-05 DIAGNOSIS — M76.31 ILIOTIBIAL BAND TENDONITIS OF RIGHT SIDE: ICD-10-CM

## 2024-11-05 DIAGNOSIS — M70.61 TROCHANTERIC BURSITIS OF RIGHT HIP: ICD-10-CM

## 2024-11-05 DIAGNOSIS — M25.561 RIGHT KNEE PAIN, UNSPECIFIED CHRONICITY: ICD-10-CM

## 2024-11-05 NOTE — PROGRESS NOTES
General Exam    Patient: Brenda Brown    YOB: 1950    Medical Record Number: [id number]    Chief Complaints: Right knee pain    History of Present Illness:     74 y.o. female patient who presents for evaluation right knee pain.  Patient was seen recently issues have pain for about a month.  States most of the pain is laterally based about the knee goes down the foot she has been noticed some lateral thigh and hip pain as well.  Did have a steroid injection on August 22 which helped the left but not so much the right.  Denies any injury.  Most discomfort is when getting up from seated position.  Has been using a cane for about a year and a half for stability.  Did have recent MRI.  Denies any mechanical symptoms.    Denies any numbness or tingling.  Denies any fevers, cough or shortness of breath.    Allergies:   Allergies   Allergen Reactions    Codeine Nausea And Vomiting and GI Intolerance    Topiramate Other (See Comments)     Outside Source Comment: %22eyes hurt%22  Outside Source Comment: %22eyes hurt%22      Cefazolin Rash    Cephalexin Rash       Home Medications:      Current Outpatient Medications:     acetaminophen (TYLENOL) 500 MG tablet, Take  by mouth Every 6 (Six) Hours As Needed., Disp: , Rfl:     Alcohol Swabs (PHARMACIST CHOICE ALCOHOL) pads, 2 (Two) Times a Day. as directed, Disp: , Rfl:     aspirin 81 MG EC tablet, Take 1 tablet by mouth Daily., Disp: , Rfl:     BIOTIN PO, Take  by mouth Daily., Disp: , Rfl:     Calcium 500 MG tablet, Take 600 mg by mouth 4 (Four) Times a Day., Disp: , Rfl:     cetirizine (zyrTEC) 10 MG tablet, Take 1 tablet by mouth Daily., Disp: , Rfl:     cholecalciferol (Cholecalciferol) 25 MCG (1000 UT) tablet, Take 1 tablet by mouth Daily., Disp: , Rfl:     citalopram (CeleXA) 10 MG tablet, Take 3 tablets by mouth Daily., Disp: , Rfl:     clotrimazole (LOTRIMIN) 1 % cream, Apply 1 Application topically to the appropriate area as directed 2 (Two) Times a  Day., Disp: , Rfl:     cyanocobalamin (VITAMIN B-12) 1000 MCG tablet, Take  by mouth., Disp: , Rfl:     Denosumab (PROLIA SC), Inject  under the skin into the appropriate area as directed., Disp: , Rfl:     ENULOSE 10 GM/15ML solution solution (encephalopathy), , Disp: , Rfl:     famotidine (PEPCID) 40 MG tablet, Take 1 tablet by mouth Daily., Disp: , Rfl:     fluticasone (FLONASE) 50 MCG/ACT nasal spray, , Disp: , Rfl:     folic acid (FOLVITE) 400 MCG tablet, Take 2 tablets by mouth Daily., Disp: , Rfl:     FREESTYLE LITE test strip, 2 (Two) Times a Day. use for testing, Disp: , Rfl:     glucose blood test strip, , Disp: , Rfl:     hydrocortisone 2.5 % cream, APPLY A SMALL AMOUNT TO RECTUM TWICE DAILY AS NEEDED FOR 10 DAYS, Disp: , Rfl:     lactulose (CHRONULAC) 10 GM/15ML solution, Take 30 mL by mouth 2 (Two) Times a Day As Needed., Disp: , Rfl:     Lancet Devices (SIMPLE DIAGNOSTICS LANCING DEV) misc, 2 (Two) Times a Day. as directed, Disp: , Rfl:     levothyroxine (SYNTHROID, LEVOTHROID) 88 MCG tablet, TAKE 1 (ONE) TABLET BY MOUTH ONCE DAILY ON EMPTY STOMACH, Disp: , Rfl:     magnesium chloride ER 64 MG DR tablet, Take  by mouth., Disp: , Rfl:     nitroglycerin (NITRODUR) 0.4 MG/HR patch, Place 1 patch on the skin as directed by provider Daily. Tablet if needed, Disp: , Rfl:     olopatadine (PATADAY) 0.2 % solution ophthalmic solution, Apply 1 drop to eye(s) as directed by provider., Disp: , Rfl:     omeprazole (priLOSEC) 20 MG capsule, , Disp: , Rfl:     Polyvinyl Alcohol-Povidone PF (HYPOTEARS) 1.4-0.6 % ophthalmic solution, 1-2 drops As Needed for Wound Care., Disp: , Rfl:     pravastatin (PRAVACHOL) 10 MG tablet, TAKE 1 (ONE) TABLET BY MOUTH EACH NIGHT AT BEDTIME, Disp: , Rfl:     PROBIOTIC PRODUCT PO, Take 1 tablet by mouth Daily., Disp: , Rfl:     tiZANidine (ZANAFLEX) 4 MG tablet, TAKE 1/2 TABLET (2 MG) BY MOUTH AS NEEDED, Disp: , Rfl:     Past Medical History:   Diagnosis Date    Ankle sprain     Anxiety  "    Arthritis of back     Arthritis of neck     Cardiac disease     Depression     Diabetes     Fracture of wrist     Fracture, femur     Fracture, fibula     Fracture, radius     Frozen shoulder     Hip arthrosis     Knee swelling     Low back strain     Osteoporosis     Periarthritis of shoulder     Stroke     Tennis elbow     Thyroid activity decreased f       Past Surgical History:   Procedure Laterality Date    DEEP BRAIN STIMULATOR PLACEMENT      GALLBLADDER SURGERY      TONSILLECTOMY      WRIST SURGERY         Social History     Occupational History    Not on file   Tobacco Use    Smoking status: Never     Passive exposure: Never    Smokeless tobacco: Never    Tobacco comments:     stole a few from my mother when a child   Vaping Use    Vaping status: Never Used   Substance and Sexual Activity    Alcohol use: Never     Comment: No rehab involved. Don't like alcohol    Drug use: Never    Sexual activity: Not Currently     Partners: Male     Comment: Menopause      Social History     Social History Narrative    Not on file       Family History   Problem Relation Age of Onset    Heart disease Mother     Hypertension Mother     Osteoporosis Mother     Heart disease Father     Hypertension Father     Cancer Brother         Prostate    Diabetes Other        Review of Systems:      Constitutional: Denies fever, shaking or chills         All other pertinent positives and negatives as noted above in HPI.    Physical Exam: 74 y.o. female    Vitals:    11/05/24 1351   Temp: 98.3 °F (36.8 °C)   TempSrc: Temporal   Weight: 68.4 kg (150 lb 12.8 oz)   Height: 172.7 cm (68\")       General:  Patient is awake and alert.  Appears in no acute distress or discomfort.      Musculoskeletal/Extremities:    Right knee examined positive tenderness over the lateral compartment.  Some tenderness over the IT band as well as the greater trochanter.  Gentle range of motion tolerated.  No significant pain with deep flexion indeterminate " Hernandez.  Motor and sensory intact distally.         Radiology:       Previous films and MRI reviewed to evaluate the patient's complaint/s.    MRI does show lateral meniscal tear.     No imaging for comparison.    Assessment: Right knee lateral meniscal tear, IT band tendinitis, hip bursitis      Plan:      I discussed the findings with the patient I do think her knee may be the driving factor.  She does have some discomfort of the IT band and hip bursa likely due to altered gait.  At this time would recommend some anti-inflammatory medication can take and tolerate or topical.  Also formal physical therapy to work on the above.  Like her to follow-up my sports partner Dr. Gillette for further evaluation if she is still symptomatic as scope intervention may be warranted.  If any mechanical symptoms develop she is instructed to let us know immediately.  Patient is understanding with this plan.           We will plan for follow up as above.    All questions were answered.  Patient understands and agrees with the plan.    Hola Ventura MD    11/05/2024    CC to Lisa Camargo MD

## 2024-12-03 NOTE — PROGRESS NOTES
Patient: Brenda Brown  YOB: 1950  Date of Service: 12/3/2024    Chief Complaints: Bilateral knee pain    Subjective:    History of Present Illness: Pt is seen in the office today with complaints of bilateral knee pain this is a patient that saw Dr. Doan, Lois Simental as she said she has been getting intermittent injections for many many years but about 6 months ago the right knee started bothering it was not responding to injections she has pain laterally occasionally pain that radiates down the leg to the foot but for the most part stays in the knee she is doing physical therapy currently and that has helped.  Her last injection was August no 1 particular event or injury that she can recall her past medical history is remarkable for those issues listed below and reviewed by me        Allergies:   Allergies   Allergen Reactions    Codeine Nausea And Vomiting and GI Intolerance    Topiramate Other (See Comments)     Outside Source Comment: %22eyes hurt%22  Outside Source Comment: %22eyes hurt%22      Cefazolin Rash    Cephalexin Rash       Medications:   Home Medications:  Current Outpatient Medications on File Prior to Visit   Medication Sig    acetaminophen (TYLENOL) 500 MG tablet Take  by mouth Every 6 (Six) Hours As Needed.    Alcohol Swabs (PHARMACIST CHOICE ALCOHOL) pads 2 (Two) Times a Day. as directed    aspirin 81 MG EC tablet Take 1 tablet by mouth Daily.    BIOTIN PO Take  by mouth Daily.    Calcium 500 MG tablet Take 600 mg by mouth 4 (Four) Times a Day.    cetirizine (zyrTEC) 10 MG tablet Take 1 tablet by mouth Daily.    cholecalciferol (Cholecalciferol) 25 MCG (1000 UT) tablet Take 1 tablet by mouth Daily.    citalopram (CeleXA) 10 MG tablet Take 3 tablets by mouth Daily.    clotrimazole (LOTRIMIN) 1 % cream Apply 1 Application topically to the appropriate area as directed 2 (Two) Times a Day.    cyanocobalamin (VITAMIN B-12) 1000 MCG tablet Take  by mouth.    Denosumab (PROLIA  SC) Inject  under the skin into the appropriate area as directed.    ENULOSE 10 GM/15ML solution solution (encephalopathy)     famotidine (PEPCID) 40 MG tablet Take 1 tablet by mouth Daily.    fluticasone (FLONASE) 50 MCG/ACT nasal spray     folic acid (FOLVITE) 400 MCG tablet Take 2 tablets by mouth Daily.    FREESTYLE LITE test strip 2 (Two) Times a Day. use for testing    glucose blood test strip     hydrocortisone 2.5 % cream APPLY A SMALL AMOUNT TO RECTUM TWICE DAILY AS NEEDED FOR 10 DAYS    lactulose (CHRONULAC) 10 GM/15ML solution Take 30 mL by mouth 2 (Two) Times a Day As Needed.    Lancet Devices (SIMPLE DIAGNOSTICS LANCING DEV) misc 2 (Two) Times a Day. as directed    levothyroxine (SYNTHROID, LEVOTHROID) 88 MCG tablet TAKE 1 (ONE) TABLET BY MOUTH ONCE DAILY ON EMPTY STOMACH    magnesium chloride ER 64 MG DR tablet Take  by mouth.    nitroglycerin (NITRODUR) 0.4 MG/HR patch Place 1 patch on the skin as directed by provider Daily. Tablet if needed    olopatadine (PATADAY) 0.2 % solution ophthalmic solution Apply 1 drop to eye(s) as directed by provider.    omeprazole (priLOSEC) 20 MG capsule     Polyvinyl Alcohol-Povidone PF (HYPOTEARS) 1.4-0.6 % ophthalmic solution 1-2 drops As Needed for Wound Care.    pravastatin (PRAVACHOL) 10 MG tablet TAKE 1 (ONE) TABLET BY MOUTH EACH NIGHT AT BEDTIME    PROBIOTIC PRODUCT PO Take 1 tablet by mouth Daily.    tiZANidine (ZANAFLEX) 4 MG tablet TAKE 1/2 TABLET (2 MG) BY MOUTH AS NEEDED     No current facility-administered medications on file prior to visit.     Current Medications:  Scheduled Meds:  Continuous Infusions:No current facility-administered medications for this visit.    PRN Meds:.    I have reviewed the patient's medical history in detail and updated the computerized patient record.  Review and summarization of old records include:    Past Medical History:   Diagnosis Date    Ankle sprain     Anxiety     Arthritis of back     Arthritis of neck     Cardiac  disease     Depression     Diabetes     Fracture of wrist     Fracture, femur     Fracture, fibula     Fracture, radius     Frozen shoulder     Hip arthrosis     Knee swelling     Low back strain     Osteoporosis     Periarthritis of shoulder     Stroke     Tennis elbow     Thyroid activity decreased f        Past Surgical History:   Procedure Laterality Date    DEEP BRAIN STIMULATOR PLACEMENT      GALLBLADDER SURGERY      TONSILLECTOMY      WRIST SURGERY          Social History     Occupational History    Not on file   Tobacco Use    Smoking status: Never     Passive exposure: Never    Smokeless tobacco: Never    Tobacco comments:     stole a few from my mother when a child   Vaping Use    Vaping status: Never Used   Substance and Sexual Activity    Alcohol use: Never     Comment: No rehab involved. Don't like alcohol    Drug use: Never    Sexual activity: Not Currently     Partners: Male     Comment: Menopause      Social History     Social History Narrative    Not on file        Family History   Problem Relation Age of Onset    Heart disease Mother     Hypertension Mother     Osteoporosis Mother     Heart disease Father     Hypertension Father     Cancer Brother         Prostate    Diabetes Other        ROS: 14 point review of systems was performed and was negative except for documented findings in HPI and today's encounter.     Allergies:   Allergies   Allergen Reactions    Codeine Nausea And Vomiting and GI Intolerance    Topiramate Other (See Comments)     Outside Source Comment: %22eyes hurt%22  Outside Source Comment: %22eyes hurt%22      Cefazolin Rash    Cephalexin Rash     Constitutional:  Denies fever, shaking or chills   Eyes:  Denies change in visual acuity   HENT:  Denies nasal congestion or sore throat   Respiratory:  Denies cough or shortness of breath   Cardiovascular:  Denies chest pain or severe LE edema   GI:  Denies abdominal pain, nausea, vomiting, bloody stools or diarrhea   Musculoskeletal:   Numbness, tingling, or loss of motor function only as noted above in history of present illness.  : Denies painful urination or hematuria  Integument:  Denies rash, lesion or ulceration   Neurologic:  Denies headache or focal weakness  Endocrine:  Denies lymphadenopathy  Psych:  Denies confusion or change in mental status   Hem:  Denies active bleeding      Physical Exam: 74 y.o. female  Wt Readings from Last 3 Encounters:   11/05/24 68.4 kg (150 lb 12.8 oz)   10/08/24 68 kg (150 lb)   08/22/24 69.9 kg (154 lb)       There is no height or weight on file to calculate BMI.    There were no vitals filed for this visit.  Vital signs reviewed.   General Appearance:    Alert, cooperative, in no acute distress                    Ortho exam    Physical exam of the right knee reveals no effusion no redness.  The patient does have tenderness about the lateral joint line.  No tenderness about the medial joint line.  A negative bounce home and a positive lateral Hernandez.    Patient has a stable ligamentous exam.  The patient has a negative Lachman and negative anterior drawer and a negative pivot shift.  Quads are reasonable and symmetric bilaterally.  Calf is soft and nontender.  There is no overlying skin changes no lymphedema lymphadenopathy.  Patient has good hip range of motion full symmetric and asymptomatic and a normal ankle exam     Physical exam of the right knee reveals no effusion, no erythema.  The patient has no palpable tenderness along the medial joint line, no tenderness about the lateral joint line.  Patient does have crepitus with patellofemoral range of motion.  They also have subjective symptoms anteriorly during exam.  The patient has a negative bounce home, negative Hernandez and a stable ligamentous exam.  Quad tone is reasonable and symmetric.  There are no overlying skin changes no lymphedema no lymphadenopathy.  There is good hip range of motion which is full symmetric and asymptomatic and a normal  ankle exam.  Hamstrings and IT band are tight bilaterally.          I did review x-rays she does have some mild degenerative changes otherwise good maintenance of joint space she has an MRI that was ordered by TS which shows a complex tear of the lateral meniscus and some mild degenerative changes medially patellofemoral  Assessment: Bilateral knee pain the left is doing great with the injection she would like to inject that when again.  The right 1 was temporary relief she is getting better with therapy.  We had a long discussion regarding the natural history of meniscus tears the treatment with arthroscopy and conservative management.  At this point she feels like she is getting better with therapy she would like to continue that.  It has been 3 months she wanted to injected both which I think is reasonable    Plan: As above  Follow up as indicated.  Ice, elevate, and rest as needed.  Discussed conservative measures of pain control including ice, bracing.   Makenna Gillette M.D.          Large Joint Arthrocentesis: R knee  Date/Time: 12/5/2024 1:36 PM  Consent given by: patient  Site marked: site marked  Timeout: Immediately prior to procedure a time out was called to verify the correct patient, procedure, equipment, support staff and site/side marked as required   Supporting Documentation  Indications: pain   Procedure Details  Location: knee - R knee  Preparation: Patient was prepped and draped in the usual sterile fashion  Needle gauge: 21G.  Medications administered: 1 mL methylPREDNISolone acetate 80 MG/ML; 2 mL lidocaine PF 1% 1 %  Patient tolerance: patient tolerated the procedure well with no immediate complications      Large Joint Arthrocentesis: L knee  Date/Time: 12/5/2024 1:36 PM  Consent given by: patient  Site marked: site marked  Timeout: Immediately prior to procedure a time out was called to verify the correct patient, procedure, equipment, support staff and site/side marked as required   Supporting  Documentation  Indications: pain   Procedure Details  Location: knee - L knee  Preparation: Patient was prepped and draped in the usual sterile fashion  Needle gauge: 21G.  Medications administered: 1 mL methylPREDNISolone acetate 80 MG/ML; 2 mL lidocaine PF 1% 1 %  Patient tolerance: patient tolerated the procedure well with no immediate complications

## 2024-12-05 ENCOUNTER — OFFICE VISIT (OUTPATIENT)
Dept: ORTHOPEDIC SURGERY | Facility: CLINIC | Age: 74
End: 2024-12-05
Payer: MEDICARE

## 2024-12-05 VITALS — BODY MASS INDEX: 22.64 KG/M2 | TEMPERATURE: 98.3 F | HEIGHT: 68 IN | WEIGHT: 149.4 LBS

## 2024-12-05 DIAGNOSIS — M17.10 PATELLOFEMORAL ARTHRITIS: ICD-10-CM

## 2024-12-05 DIAGNOSIS — S83.281A ACUTE LATERAL MENISCUS TEAR OF RIGHT KNEE, INITIAL ENCOUNTER: Primary | ICD-10-CM

## 2024-12-05 RX ORDER — METHYLPREDNISOLONE ACETATE 80 MG/ML
1 INJECTION, SUSPENSION INTRA-ARTICULAR; INTRALESIONAL; INTRAMUSCULAR; SOFT TISSUE
Status: COMPLETED | OUTPATIENT
Start: 2024-12-05 | End: 2024-12-05

## 2024-12-05 RX ORDER — LINAGLIPTIN 5 MG/1
5 TABLET, FILM COATED ORAL DAILY
COMMUNITY
Start: 2024-11-21 | End: 2025-05-20

## 2024-12-05 RX ORDER — LIDOCAINE HYDROCHLORIDE 10 MG/ML
2 INJECTION, SOLUTION EPIDURAL; INFILTRATION; INTRACAUDAL; PERINEURAL
Status: COMPLETED | OUTPATIENT
Start: 2024-12-05 | End: 2024-12-05

## 2024-12-05 RX ORDER — LEVOTHYROXINE SODIUM 100 UG/1
TABLET ORAL
COMMUNITY

## 2024-12-05 RX ORDER — CITALOPRAM HYDROBROMIDE 20 MG/1
1 TABLET ORAL DAILY
COMMUNITY
Start: 2024-11-25

## 2024-12-05 RX ADMIN — METHYLPREDNISOLONE ACETATE 1 ML: 80 INJECTION, SUSPENSION INTRA-ARTICULAR; INTRALESIONAL; INTRAMUSCULAR; SOFT TISSUE at 13:36

## 2024-12-05 RX ADMIN — LIDOCAINE HYDROCHLORIDE 2 ML: 10 INJECTION, SOLUTION EPIDURAL; INFILTRATION; INTRACAUDAL; PERINEURAL at 13:36

## 2025-03-04 ENCOUNTER — TELEPHONE (OUTPATIENT)
Dept: ORTHOPEDIC SURGERY | Facility: CLINIC | Age: 75
End: 2025-03-04

## 2025-03-04 NOTE — TELEPHONE ENCOUNTER
I know we cannot fax images but maybe powershare or just mail disc.    [facility]  [address], [place], [place] [address]

## 2025-03-04 NOTE — TELEPHONE ENCOUNTER
Caller: ADONAY COTA     Relationship: PATIENT     Best call back number: [phone]    What form or medical record are you requesting: RT KNEE XRAY  11/05/2024, MRI RT KNEE 10/18/2024    Who is requesting this form or medical record from you: [facility], DR BECKY REEDER     How would you like to receive the form or medical records (pick-up, mail, fax): FAX  If fax, what is the fax number: [phone]  Timeframe paperwork needed: ASAP    Additional notes: PATIENT IS STAYING WITH FAMILY FOR AN EXTENDED AMOUNT OF TIME IN [place] AND NEEDS CARE FOR HER KNEE - PLEASE FAX MOST RECENT XRAY AND MRI REPORTS FOR RT KNEE PLEASE

## 2025-05-07 NOTE — PROGRESS NOTES
Patient: Brenda Brown  YOB: 1950  Date of Service: 5/7/2025    Chief Complaints: Bilateral knee pain    Subjective:    History of Present Illness: Pt is seen in the office today with complaints of bilateral knee pain I last saw her 6 months ago a little over 6 months ago.  She had seen [facility] and TS she did have a MRI that showed a lateral meniscus tear but did have some degenerative changes.  I injected both she got great relief she has been in [place] taking care of her sister-in-law and she saw an orthopedist there who did an injection a little over 3 months ago.  She did bring the office notes from there which is quite helpful        Allergies:   Allergies   Allergen Reactions    Codeine Nausea And Vomiting and GI Intolerance    Topiramate Other (See Comments)     Outside Source Comment: %22eyes hurt%22  Outside Source Comment: %22eyes hurt%22      Cefazolin Rash    Cephalexin Rash       Medications:   Home Medications:  Current Outpatient Medications on File Prior to Visit   Medication Sig    acetaminophen (TYLENOL) 500 MG tablet Take  by mouth Every 6 (Six) Hours As Needed.    Alcohol Swabs (PHARMACIST CHOICE ALCOHOL) pads 2 (Two) Times a Day. as directed    aspirin 81 MG EC tablet Take 1 tablet by mouth Daily.    BIOTIN PO Take  by mouth Daily.    Calcium 500 MG tablet Take 600 mg by mouth 4 (Four) Times a Day.    cholecalciferol (Cholecalciferol) 25 MCG (1000 UT) tablet Take 1 tablet by mouth Daily.    citalopram (CeleXA) 20 MG tablet Take 1 tablet by mouth Daily.    clotrimazole (LOTRIMIN) 1 % cream Apply 1 Application topically to the appropriate area as directed 2 (Two) Times a Day.    cyanocobalamin (VITAMIN B-12) 1000 MCG tablet Take  by mouth.    Denosumab (PROLIA SC) Inject  under the skin into the appropriate area as directed.    ENULOSE 10 GM/15ML solution solution (encephalopathy)     famotidine (PEPCID) 40 MG tablet Take 1 tablet by mouth Daily.    folic acid (FOLVITE) 400 MCG  tablet Take 2 tablets by mouth Daily.    FREESTYLE LITE test strip 2 (Two) Times a Day. use for testing    hydrocortisone 2.5 % cream APPLY A SMALL AMOUNT TO RECTUM TWICE DAILY AS NEEDED FOR 10 DAYS    lactulose (CHRONULAC) 10 GM/15ML solution Take 30 mL by mouth 2 (Two) Times a Day As Needed.    Lancet Devices (SIMPLE DIAGNOSTICS LANCING DEV) misc 2 (Two) Times a Day. as directed    levothyroxine (SYNTHROID, LEVOTHROID) 100 MCG tablet TAKE 1 (ONE) TABLET BY MOUTH ONCE DAILY DOSE INCREASE    levothyroxine (SYNTHROID, LEVOTHROID) 88 MCG tablet TAKE 1 (ONE) TABLET BY MOUTH ONCE DAILY ON EMPTY STOMACH    magnesium chloride ER 64 MG DR tablet Take  by mouth.    nitroglycerin (NITRODUR) 0.4 MG/HR patch Place 1 patch on the skin as directed by provider Daily. Tablet if needed    olopatadine (PATADAY) 0.2 % solution ophthalmic solution Apply 1 drop to eye(s) as directed by provider.    omeprazole (priLOSEC) 20 MG capsule     pravastatin (PRAVACHOL) 10 MG tablet TAKE 1 (ONE) TABLET BY MOUTH EACH NIGHT AT BEDTIME    PROBIOTIC PRODUCT PO Take 1 tablet by mouth Daily.    tiZANidine (ZANAFLEX) 4 MG tablet TAKE 1/2 TABLET (2 MG) BY MOUTH AS NEEDED    Tradjenta 5 MG tablet tablet Take 1 tablet by mouth Daily.     No current facility-administered medications on file prior to visit.     Current Medications:  Scheduled Meds:  Continuous Infusions:No current facility-administered medications for this visit.    PRN Meds:.    I have reviewed the patient's medical history in detail and updated the computerized patient record.  Review and summarization of old records include:    Past Medical History:   Diagnosis Date    Ankle sprain     Anxiety     Arthritis of back     Arthritis of neck     Cardiac disease     Depression     Diabetes     Fracture of wrist     Fracture, femur     Fracture, fibula     Fracture, radius     Frozen shoulder     Hip arthrosis     Knee swelling     Low back strain     Osteoporosis     Periarthritis of shoulder      Stroke     Tennis elbow     Thoracic disc disorder     Thyroid activity decreased f        Past Surgical History:   Procedure Laterality Date    DEEP BRAIN STIMULATOR PLACEMENT      GALLBLADDER SURGERY      TONSILLECTOMY      WRIST SURGERY          Social History     Occupational History    Not on file   Tobacco Use    Smoking status: Never     Passive exposure: Never    Smokeless tobacco: Never    Tobacco comments:     stole a few from my mother when a child   Vaping Use    Vaping status: Never Used   Substance and Sexual Activity    Alcohol use: Never     Comment: No rehab involved. Don't like alcohol    Drug use: Never    Sexual activity: Not Currently     Partners: Male     Comment: Menopause      Social History     Social History Narrative    Not on file        Family History   Problem Relation Age of Onset    Heart disease Mother     Hypertension Mother     Osteoporosis Mother     Heart disease Father     Hypertension Father     Cancer Brother         Prostate    Diabetes Other        ROS: 14 point review of systems was performed and was negative except for documented findings in HPI and today's encounter.     Allergies:   Allergies   Allergen Reactions    Codeine Nausea And Vomiting and GI Intolerance    Topiramate Other (See Comments)     Outside Source Comment: %22eyes hurt%22  Outside Source Comment: %22eyes hurt%22      Cefazolin Rash    Cephalexin Rash     Constitutional:  Denies fever, shaking or chills   Eyes:  Denies change in visual acuity   HENT:  Denies nasal congestion or sore throat   Respiratory:  Denies cough or shortness of breath   Cardiovascular:  Denies chest pain or severe LE edema   GI:  Denies abdominal pain, nausea, vomiting, bloody stools or diarrhea   Musculoskeletal:  Numbness, tingling, or loss of motor function only as noted above in history of present illness.  : Denies painful urination or hematuria  Integument:  Denies rash, lesion or ulceration   Neurologic:  Denies  headache or focal weakness  Endocrine:  Denies lymphadenopathy  Psych:  Denies confusion or change in mental status   Hem:  Denies active bleeding      Physical Exam: 74 y.o. female  Wt Readings from Last 3 Encounters:   12/05/24 67.8 kg (149 lb 6.4 oz)   11/05/24 68.4 kg (150 lb 12.8 oz)   10/08/24 68 kg (150 lb)       There is no height or weight on file to calculate BMI.    There were no vitals filed for this visit.  Vital signs reviewed.   General Appearance:    Alert, cooperative, in no acute distress                    Ortho exam    Physical exam of the right and left knee reveals no effusion, no erythema.  The patient has no palpable tenderness along the medial joint line, no tenderness about the lateral joint line.  Patient does have crepitus with patellofemoral range of motion.  They also have subjective symptoms anteriorly during exam.  The patient has a negative bounce home, negative Hernandez and a stable ligamentous exam.  Quad tone is reasonable and symmetric.  There are no overlying skin changes no lymphedema no lymphadenopathy.  There is good hip range of motion which is full symmetric and asymptomatic and a normal ankle exam.  Hamstrings and IT band are tight bilaterally.       I did review x-rays from prior visits results are as above         Assessment: Bilateral knee pain I do think this is degenerative which she knows she has a lateral meniscus tear we can always scope this but she gets good relief from injections I think is reasonable to do those again.  I think this is more of an acute on chronic have not seen her for 6 months.  She is a diabetic she knows her risk are higher she knows to be diligent with her blood sugars    Plan:   Follow up as indicated.  Ice, elevate, and rest as needed.  Discussed conservative measures of pain control including ice, bracing.  Also talked about the importance of strengthening Makenna Gillette M.D.

## 2025-06-16 ENCOUNTER — OFFICE VISIT (OUTPATIENT)
Dept: ORTHOPEDIC SURGERY | Facility: CLINIC | Age: 75
End: 2025-06-16
Payer: MEDICARE

## 2025-06-16 VITALS — WEIGHT: 152.2 LBS | HEIGHT: 68 IN | TEMPERATURE: 98.2 F | BODY MASS INDEX: 23.07 KG/M2

## 2025-06-16 DIAGNOSIS — M17.10 PATELLOFEMORAL ARTHRITIS: Primary | ICD-10-CM

## 2025-06-16 DIAGNOSIS — S83.289D: ICD-10-CM

## 2025-07-08 ENCOUNTER — OFFICE (AMBULATORY)
Dept: URBAN - METROPOLITAN AREA CLINIC 76 | Facility: CLINIC | Age: 75
End: 2025-07-08
Payer: MEDICARE

## 2025-07-08 VITALS
WEIGHT: 150 LBS | OXYGEN SATURATION: 97 % | DIASTOLIC BLOOD PRESSURE: 78 MMHG | HEART RATE: 73 BPM | HEIGHT: 68 IN | SYSTOLIC BLOOD PRESSURE: 122 MMHG

## 2025-07-08 DIAGNOSIS — K59.00 CONSTIPATION, UNSPECIFIED: ICD-10-CM

## 2025-07-08 DIAGNOSIS — K21.9 GASTRO-ESOPHAGEAL REFLUX DISEASE WITHOUT ESOPHAGITIS: ICD-10-CM

## 2025-07-08 PROCEDURE — 99213 OFFICE O/P EST LOW 20 MIN: CPT
